# Patient Record
Sex: MALE | Race: WHITE | NOT HISPANIC OR LATINO | Employment: FULL TIME | URBAN - METROPOLITAN AREA
[De-identification: names, ages, dates, MRNs, and addresses within clinical notes are randomized per-mention and may not be internally consistent; named-entity substitution may affect disease eponyms.]

---

## 2020-11-20 PROCEDURE — U0003 INFECTIOUS AGENT DETECTION BY NUCLEIC ACID (DNA OR RNA); SEVERE ACUTE RESPIRATORY SYNDROME CORONAVIRUS 2 (SARS-COV-2) (CORONAVIRUS DISEASE [COVID-19]), AMPLIFIED PROBE TECHNIQUE, MAKING USE OF HIGH THROUGHPUT TECHNOLOGIES AS DESCRIBED BY CMS-2020-01-R: HCPCS | Performed by: NURSE PRACTITIONER

## 2022-01-05 ENCOUNTER — APPOINTMENT (OUTPATIENT)
Dept: GENERAL RADIOLOGY | Facility: HOSPITAL | Age: 62
End: 2022-01-05

## 2022-01-05 ENCOUNTER — HOSPITAL ENCOUNTER (EMERGENCY)
Facility: HOSPITAL | Age: 62
Discharge: HOME | End: 2022-01-05
Admitting: EMERGENCY MEDICINE

## 2022-01-05 VITALS
TEMPERATURE: 97.2 F | OXYGEN SATURATION: 96 % | WEIGHT: 230.16 LBS | RESPIRATION RATE: 16 BRPM | HEART RATE: 63 BPM | SYSTOLIC BLOOD PRESSURE: 127 MMHG | BODY MASS INDEX: 29.54 KG/M2 | HEIGHT: 74 IN | DIASTOLIC BLOOD PRESSURE: 82 MMHG

## 2022-01-05 DIAGNOSIS — M70.41 BURSITIS, PREPATELLAR, RIGHT: Primary | ICD-10-CM

## 2022-01-05 LAB
ALBUMIN SERPL-MCNC: 3.9 G/DL (ref 3.5–5.2)
ALBUMIN/GLOB SERPL: 1.3 G/DL
ALP SERPL-CCNC: 97 U/L (ref 39–117)
ALT SERPL W P-5'-P-CCNC: 11 U/L (ref 1–41)
ANION GAP SERPL CALCULATED.3IONS-SCNC: 12 MMOL/L (ref 5–15)
AST SERPL-CCNC: 9 U/L (ref 1–40)
BILIRUB SERPL-MCNC: 0.5 MG/DL (ref 0–1.2)
BUN SERPL-MCNC: 13 MG/DL (ref 8–23)
BUN/CREAT SERPL: 18.1 (ref 7–25)
CALCIUM SPEC-SCNC: 9.2 MG/DL (ref 8.6–10.5)
CHLORIDE SERPL-SCNC: 97 MMOL/L (ref 98–107)
CO2 SERPL-SCNC: 25 MMOL/L (ref 22–29)
CREAT SERPL-MCNC: 0.72 MG/DL (ref 0.76–1.27)
CRP SERPL-MCNC: 6.05 MG/DL (ref 0–0.5)
ERYTHROCYTE [SEDIMENTATION RATE] IN BLOOD: 26 MM/HR (ref 0–20)
GFR SERPL CREATININE-BSD FRML MDRD: 111 ML/MIN/1.73
GLOBULIN UR ELPH-MCNC: 3.1 GM/DL
GLUCOSE SERPL-MCNC: 411 MG/DL (ref 65–99)
POTASSIUM SERPL-SCNC: 3.9 MMOL/L (ref 3.5–5.2)
PROT SERPL-MCNC: 7 G/DL (ref 6–8.5)
SODIUM SERPL-SCNC: 134 MMOL/L (ref 136–145)
URATE SERPL-MCNC: 3.1 MG/DL (ref 3.4–7)

## 2022-01-05 PROCEDURE — 86140 C-REACTIVE PROTEIN: CPT | Performed by: NURSE PRACTITIONER

## 2022-01-05 PROCEDURE — 84550 ASSAY OF BLOOD/URIC ACID: CPT | Performed by: NURSE PRACTITIONER

## 2022-01-05 PROCEDURE — 87147 CULTURE TYPE IMMUNOLOGIC: CPT | Performed by: NURSE PRACTITIONER

## 2022-01-05 PROCEDURE — 99283 EMERGENCY DEPT VISIT LOW MDM: CPT

## 2022-01-05 PROCEDURE — 80053 COMPREHEN METABOLIC PANEL: CPT | Performed by: NURSE PRACTITIONER

## 2022-01-05 PROCEDURE — 87205 SMEAR GRAM STAIN: CPT | Performed by: NURSE PRACTITIONER

## 2022-01-05 PROCEDURE — 87070 CULTURE OTHR SPECIMN AEROBIC: CPT | Performed by: NURSE PRACTITIONER

## 2022-01-05 PROCEDURE — 85652 RBC SED RATE AUTOMATED: CPT | Performed by: NURSE PRACTITIONER

## 2022-01-05 PROCEDURE — 87186 SC STD MICRODIL/AGAR DIL: CPT | Performed by: NURSE PRACTITIONER

## 2022-01-05 PROCEDURE — 73562 X-RAY EXAM OF KNEE 3: CPT

## 2022-01-05 RX ORDER — SODIUM CHLORIDE 0.9 % (FLUSH) 0.9 %
10 SYRINGE (ML) INJECTION AS NEEDED
Status: DISCONTINUED | OUTPATIENT
Start: 2022-01-05 | End: 2022-01-05 | Stop reason: HOSPADM

## 2022-01-05 NOTE — DISCHARGE INSTRUCTIONS
Rest and compress with ace wrap.  If pain and swelling persist past 5 to 7 days please call your primary care physician and see for further complaints.  May take Tylenol and/or ibuprofen as directed to help with pain and swelling.

## 2022-01-05 NOTE — ED NOTES
Discussed pt's high blood sugar, home supplies, diabetic meds due to bs registering 411.     Jacquelyn Bernstein RN  01/05/22 0625

## 2022-01-05 NOTE — ED PROVIDER NOTES
Subjective   61-year-old  male presents to the emergency room with complaint of right knee swelling with minimal pain.  Patient states that he may have bumped his knee on something a few days ago.  Patient denies fever cough congestion.  Patient denies nausea vomiting or diarrhea.  Onset: Few days  Location: Top of right knee  Duration: Few days  Character: Swelling and redness   aggravating/Alleviating Factors: Unknown but thinks he might have bumped his knee against something/rest  Radiation: None  Severity: Moderate to severe            Review of Systems   Musculoskeletal: Positive for arthralgias, joint swelling and myalgias. Negative for gait problem.   All other systems reviewed and are negative.      Past Medical History:   Diagnosis Date   • Diabetes mellitus (CMS/HCC)    • Disease of thyroid gland    • Hyperlipidemia    • Myocardial infarction (CMS/HCC)        No Known Allergies    Past Surgical History:   Procedure Laterality Date   • ANKLE SURGERY Right    • CARDIAC CATHETERIZATION     • HIP SURGERY Right    • ROTATOR CUFF REPAIR Right        No family history on file.    Social History     Socioeconomic History   • Marital status: Single   Tobacco Use   • Smoking status: Never Smoker   • Smokeless tobacco: Never Used   Vaping Use   • Vaping Use: Never used   Substance and Sexual Activity   • Alcohol use: Not Currently           Objective   Physical Exam  Constitutional:       General: He is not in acute distress.     Appearance: Normal appearance. He is not ill-appearing, toxic-appearing or diaphoretic.   Musculoskeletal:         General: Normal range of motion.        Legs:       Comments: Positive localized erythema and moderate edema noted just subcutaneous and top of patella bone.   Skin:     General: Skin is warm.      Capillary Refill: Capillary refill takes less than 2 seconds.      Findings: Erythema present. No abscess or signs of injury.          Neurological:      Mental Status: He is  alert.         Procedures           ED Course      XR Knee 3 View Right    Result Date: 1/5/2022  Abnormal prepatellar and infrapatellar superficial soft tissue swelling suggestive of prepatellar bursitis. No underlying osseous abnormality.  Electronically Signed By-Gricel Cunningham MD On:1/5/2022 12:43 PM This report was finalized on [id number] by  Gricel Cunningham MD.      Labs Reviewed   COMPREHENSIVE METABOLIC PANEL - Abnormal; Notable for the following components:       Result Value    Glucose 411 (*)     Creatinine 0.72 (*)     Sodium 134 (*)     Chloride 97 (*)     All other components within normal limits    Narrative:     GFR Normal >60  Chronic Kidney Disease <60  Kidney Failure <15     SEDIMENTATION RATE - Abnormal; Notable for the following components:    Sed Rate 26 (*)     All other components within normal limits   C-REACTIVE PROTEIN - Abnormal; Notable for the following components:    C-Reactive Protein 6.05 (*)     All other components within normal limits   URIC ACID - Abnormal; Notable for the following components:    Uric Acid 3.1 (*)     All other components within normal limits   WOUND CULTURE             Compression applied prior to discharge.  Encourage patient to follow-up with his primary care physician within the next 3 to 5 days for further evaluation care of his right knee pain and swelling.  Courage patient to wear compression and apply  ice pack to right knee to help with swelling.                     Vitals:    01/05/22 1424   BP: 127/82   Pulse: 63   Resp: 16   Temp: 97.2 °F (36.2 °C)   SpO2: 96%                     MDM    Final diagnoses:   Bursitis, prepatellar, right       ED Disposition  ED Disposition     ED Disposition Condition Comment    Discharge Stable           Damian Win MD  [facility] [address]  [phone]    Schedule an appointment as soon as possible for a visit in 1 week  As needed, If symptoms worsen         Medication List      No changes  were made to your prescriptions during this visit.          Jacqueline Orr, APRN  01/05/22 3347

## 2022-01-06 NOTE — PHARMACY RECOMMENDATION
Preliminary wound culture resulted with Staphylococcus aureus (MSSA).  Patient was seen for knee pain and swelling. At that time his CRP was 6, ESR 26, WBC wnl, afebrile, on XR: abnormal prepatellar and infrapatellar superficial soft tissue swelling. Patient was advised to f/u with PCP, wear compression and apply ice, but was not prescribed any antibiotics. Discussed case with Shanice Gonzalez and Dr King who advised to bring patient in for re-evaluation and potential need for additional imaging. Attempted to call the patient, no answer and VM box is full. Attempted to call patient's contact (mother), no answer but I was able to leave a VM asking to call back.   Will attempt to call tomorrow. Will continue to follow until C&S final.     Microbiology Results (last 10 days)       Procedure Component Value - Date/Time    Wound Culture - Wound, Knee, Right [[id number]]  (Abnormal) Collected: 01/05/22 1328    Lab Status: Preliminary result Specimen: Wound from Knee, Right Updated: 01/06/22 1042     Wound Culture Heavy growth (4+) Staphylococcus aureus     Gram Stain Few (2+) WBCs per low power field      Few (2+) Gram positive cocci in clusters            Chey Lazcano, PharmD  1/6/2022 13:00 EST

## 2022-01-07 ENCOUNTER — HOSPITAL ENCOUNTER (OUTPATIENT)
Facility: HOSPITAL | Age: 62
Setting detail: OBSERVATION
LOS: 1 days | Discharge: HOME HEALTH | End: 2022-01-12
Attending: EMERGENCY MEDICINE | Admitting: HOSPITALIST

## 2022-01-07 DIAGNOSIS — E11.65 UNCONTROLLED TYPE 2 DIABETES MELLITUS WITH HYPERGLYCEMIA: ICD-10-CM

## 2022-01-07 DIAGNOSIS — L03.115 CELLULITIS OF KNEE, RIGHT: ICD-10-CM

## 2022-01-07 DIAGNOSIS — M70.41 PREPATELLAR BURSITIS OF RIGHT KNEE: Primary | ICD-10-CM

## 2022-01-07 LAB
ALBUMIN SERPL-MCNC: 3.9 G/DL (ref 3.5–5.2)
ALBUMIN/GLOB SERPL: 1.2 G/DL
ALP SERPL-CCNC: 81 U/L (ref 39–117)
ALT SERPL W P-5'-P-CCNC: 8 U/L (ref 1–41)
ANION GAP SERPL CALCULATED.3IONS-SCNC: 12 MMOL/L (ref 5–15)
APPEARANCE FLD: ABNORMAL
AST SERPL-CCNC: 6 U/L (ref 1–40)
BACTERIA SPEC AEROBE CULT: ABNORMAL
BASOPHILS # BLD AUTO: 0 10*3/MM3 (ref 0–0.2)
BASOPHILS NFR BLD AUTO: 0.5 % (ref 0–1.5)
BILIRUB SERPL-MCNC: 0.7 MG/DL (ref 0–1.2)
BUN SERPL-MCNC: 13 MG/DL (ref 8–23)
BUN/CREAT SERPL: 19.7 (ref 7–25)
CALCIUM SPEC-SCNC: 9.5 MG/DL (ref 8.6–10.5)
CHLORIDE SERPL-SCNC: 98 MMOL/L (ref 98–107)
CO2 SERPL-SCNC: 25 MMOL/L (ref 22–29)
COLOR FLD: ABNORMAL
CREAT SERPL-MCNC: 0.66 MG/DL (ref 0.76–1.27)
CRP SERPL-MCNC: 12.51 MG/DL (ref 0–0.5)
CRYSTALS FLD MICRO: NORMAL
DEPRECATED RDW RBC AUTO: 39.8 FL (ref 37–54)
EOSINOPHIL # BLD AUTO: 0.1 10*3/MM3 (ref 0–0.4)
EOSINOPHIL NFR BLD AUTO: 0.5 % (ref 0.3–6.2)
ERYTHROCYTE [DISTWIDTH] IN BLOOD BY AUTOMATED COUNT: 13.1 % (ref 12.3–15.4)
ERYTHROCYTE [SEDIMENTATION RATE] IN BLOOD: 41 MM/HR (ref 0–20)
ETHANOL UR QL: <0.01 %
GFR SERPL CREATININE-BSD FRML MDRD: 122 ML/MIN/1.73
GLOBULIN UR ELPH-MCNC: 3.2 GM/DL
GLUCOSE SERPL-MCNC: 383 MG/DL (ref 65–99)
GRAM STN SPEC: ABNORMAL
GRAM STN SPEC: ABNORMAL
HCT VFR BLD AUTO: 41 % (ref 37.5–51)
HGB BLD-MCNC: 14.6 G/DL (ref 13–17.7)
LYMPHOCYTES # BLD AUTO: 1.5 10*3/MM3 (ref 0.7–3.1)
LYMPHOCYTES NFR BLD AUTO: 15.9 % (ref 19.6–45.3)
LYMPHOCYTES NFR FLD MANUAL: 2 %
MCH RBC QN AUTO: 29.7 PG (ref 26.6–33)
MCHC RBC AUTO-ENTMCNC: 35.6 G/DL (ref 31.5–35.7)
MCV RBC AUTO: 83.5 FL (ref 79–97)
METHOD: ABNORMAL
MONOCYTES # BLD AUTO: 0.5 10*3/MM3 (ref 0.1–0.9)
MONOCYTES NFR BLD AUTO: 5.6 % (ref 5–12)
NEUTROPHILS NFR BLD AUTO: 7.4 10*3/MM3 (ref 1.7–7)
NEUTROPHILS NFR BLD AUTO: 77.5 % (ref 42.7–76)
NEUTROPHILS NFR FLD MANUAL: 98 %
NRBC BLD AUTO-RTO: 0 /100 WBC (ref 0–0.2)
NUC CELL # FLD: 7849 /MM3
PLATELET # BLD AUTO: 243 10*3/MM3 (ref 140–450)
PMV BLD AUTO: 8 FL (ref 6–12)
POTASSIUM SERPL-SCNC: 4.3 MMOL/L (ref 3.5–5.2)
PROT SERPL-MCNC: 7.1 G/DL (ref 6–8.5)
RBC # BLD AUTO: 4.92 10*6/MM3 (ref 4.14–5.8)
SARS-COV-2 RNA PNL SPEC NAA+PROBE: DETECTED
SODIUM SERPL-SCNC: 135 MMOL/L (ref 136–145)
WBC NRBC COR # BLD: 9.6 10*3/MM3 (ref 3.4–10.8)

## 2022-01-07 PROCEDURE — 89060 EXAM SYNOVIAL FLUID CRYSTALS: CPT | Performed by: EMERGENCY MEDICINE

## 2022-01-07 PROCEDURE — G0378 HOSPITAL OBSERVATION PER HR: HCPCS

## 2022-01-07 PROCEDURE — 99284 EMERGENCY DEPT VISIT MOD MDM: CPT

## 2022-01-07 PROCEDURE — 83036 HEMOGLOBIN GLYCOSYLATED A1C: CPT | Performed by: NURSE PRACTITIONER

## 2022-01-07 PROCEDURE — 96367 TX/PROPH/DG ADDL SEQ IV INF: CPT

## 2022-01-07 PROCEDURE — C9803 HOPD COVID-19 SPEC COLLECT: HCPCS

## 2022-01-07 PROCEDURE — 25010000002 KETOROLAC TROMETHAMINE PER 15 MG: Performed by: EMERGENCY MEDICINE

## 2022-01-07 PROCEDURE — 87205 SMEAR GRAM STAIN: CPT | Performed by: EMERGENCY MEDICINE

## 2022-01-07 PROCEDURE — 84157 ASSAY OF PROTEIN OTHER: CPT | Performed by: EMERGENCY MEDICINE

## 2022-01-07 PROCEDURE — 87635 SARS-COV-2 COVID-19 AMP PRB: CPT | Performed by: NURSE PRACTITIONER

## 2022-01-07 PROCEDURE — 96366 THER/PROPH/DIAG IV INF ADDON: CPT

## 2022-01-07 PROCEDURE — 87070 CULTURE OTHR SPECIMN AEROBIC: CPT | Performed by: EMERGENCY MEDICINE

## 2022-01-07 PROCEDURE — 63710000001 INSULIN LISPRO (HUMAN) PER 5 UNITS: Performed by: EMERGENCY MEDICINE

## 2022-01-07 PROCEDURE — 86140 C-REACTIVE PROTEIN: CPT | Performed by: EMERGENCY MEDICINE

## 2022-01-07 PROCEDURE — 87186 SC STD MICRODIL/AGAR DIL: CPT | Performed by: EMERGENCY MEDICINE

## 2022-01-07 PROCEDURE — 25010000002 VANCOMYCIN 10 G RECONSTITUTED SOLUTION: Performed by: EMERGENCY MEDICINE

## 2022-01-07 PROCEDURE — 87040 BLOOD CULTURE FOR BACTERIA: CPT | Performed by: EMERGENCY MEDICINE

## 2022-01-07 PROCEDURE — 85025 COMPLETE CBC W/AUTO DIFF WBC: CPT | Performed by: EMERGENCY MEDICINE

## 2022-01-07 PROCEDURE — 96361 HYDRATE IV INFUSION ADD-ON: CPT

## 2022-01-07 PROCEDURE — 96365 THER/PROPH/DIAG IV INF INIT: CPT

## 2022-01-07 PROCEDURE — 85652 RBC SED RATE AUTOMATED: CPT | Performed by: EMERGENCY MEDICINE

## 2022-01-07 PROCEDURE — 80053 COMPREHEN METABOLIC PANEL: CPT | Performed by: EMERGENCY MEDICINE

## 2022-01-07 PROCEDURE — 99219 PR INITIAL OBSERVATION CARE/DAY 50 MINUTES: CPT | Performed by: NURSE PRACTITIONER

## 2022-01-07 PROCEDURE — 89051 BODY FLUID CELL COUNT: CPT | Performed by: EMERGENCY MEDICINE

## 2022-01-07 PROCEDURE — 96375 TX/PRO/DX INJ NEW DRUG ADDON: CPT

## 2022-01-07 PROCEDURE — 25010000002 ONDANSETRON PER 1 MG: Performed by: EMERGENCY MEDICINE

## 2022-01-07 PROCEDURE — 87147 CULTURE TYPE IMMUNOLOGIC: CPT | Performed by: EMERGENCY MEDICINE

## 2022-01-07 PROCEDURE — 0 MORPHINE SULFATE 4 MG/ML SOLUTION: Performed by: EMERGENCY MEDICINE

## 2022-01-07 PROCEDURE — 82945 GLUCOSE OTHER FLUID: CPT | Performed by: EMERGENCY MEDICINE

## 2022-01-07 PROCEDURE — 82077 ASSAY SPEC XCP UR&BREATH IA: CPT | Performed by: NURSE PRACTITIONER

## 2022-01-07 RX ORDER — KETOROLAC TROMETHAMINE 30 MG/ML
30 INJECTION, SOLUTION INTRAMUSCULAR; INTRAVENOUS ONCE
Status: COMPLETED | OUTPATIENT
Start: 2022-01-07 | End: 2022-01-07

## 2022-01-07 RX ORDER — POLYETHYLENE GLYCOL 3350 17 G/17G
17 POWDER, FOR SOLUTION ORAL DAILY PRN
Status: DISCONTINUED | OUTPATIENT
Start: 2022-01-07 | End: 2022-01-12 | Stop reason: HOSPADM

## 2022-01-07 RX ORDER — SODIUM CHLORIDE 0.9 % (FLUSH) 0.9 %
3-10 SYRINGE (ML) INJECTION AS NEEDED
Status: DISCONTINUED | OUTPATIENT
Start: 2022-01-07 | End: 2022-01-12 | Stop reason: HOSPADM

## 2022-01-07 RX ORDER — ACETAMINOPHEN 650 MG/1
650 SUPPOSITORY RECTAL EVERY 4 HOURS PRN
Status: DISCONTINUED | OUTPATIENT
Start: 2022-01-07 | End: 2022-01-12 | Stop reason: HOSPADM

## 2022-01-07 RX ORDER — CHOLECALCIFEROL (VITAMIN D3) 125 MCG
5 CAPSULE ORAL NIGHTLY PRN
Status: DISCONTINUED | OUTPATIENT
Start: 2022-01-07 | End: 2022-01-12 | Stop reason: HOSPADM

## 2022-01-07 RX ORDER — CLINDAMYCIN PHOSPHATE 900 MG/50ML
900 INJECTION, SOLUTION INTRAVENOUS ONCE
Status: COMPLETED | OUTPATIENT
Start: 2022-01-07 | End: 2022-01-07

## 2022-01-07 RX ORDER — CLINDAMYCIN PHOSPHATE 900 MG/50ML
900 INJECTION, SOLUTION INTRAVENOUS EVERY 8 HOURS
Status: DISCONTINUED | OUTPATIENT
Start: 2022-01-08 | End: 2022-01-10

## 2022-01-07 RX ORDER — NITROGLYCERIN 0.4 MG/1
0.4 TABLET SUBLINGUAL
Status: DISCONTINUED | OUTPATIENT
Start: 2022-01-07 | End: 2022-01-12 | Stop reason: HOSPADM

## 2022-01-07 RX ORDER — INSULIN LISPRO 100 [IU]/ML
0-9 INJECTION, SOLUTION INTRAVENOUS; SUBCUTANEOUS
Status: DISCONTINUED | OUTPATIENT
Start: 2022-01-08 | End: 2022-01-12 | Stop reason: HOSPADM

## 2022-01-07 RX ORDER — AMOXICILLIN 250 MG
2 CAPSULE ORAL 2 TIMES DAILY
Status: DISCONTINUED | OUTPATIENT
Start: 2022-01-07 | End: 2022-01-12 | Stop reason: HOSPADM

## 2022-01-07 RX ORDER — ACETAMINOPHEN 325 MG/1
650 TABLET ORAL EVERY 4 HOURS PRN
Status: DISCONTINUED | OUTPATIENT
Start: 2022-01-07 | End: 2022-01-12 | Stop reason: HOSPADM

## 2022-01-07 RX ORDER — SODIUM CHLORIDE 0.9 % (FLUSH) 0.9 %
3 SYRINGE (ML) INJECTION EVERY 12 HOURS SCHEDULED
Status: DISCONTINUED | OUTPATIENT
Start: 2022-01-07 | End: 2022-01-12 | Stop reason: HOSPADM

## 2022-01-07 RX ORDER — ONDANSETRON 4 MG/1
4 TABLET, FILM COATED ORAL EVERY 6 HOURS PRN
Status: DISCONTINUED | OUTPATIENT
Start: 2022-01-07 | End: 2022-01-12 | Stop reason: HOSPADM

## 2022-01-07 RX ORDER — DEXTROSE MONOHYDRATE 25 G/50ML
25 INJECTION, SOLUTION INTRAVENOUS
Status: DISCONTINUED | OUTPATIENT
Start: 2022-01-07 | End: 2022-01-12 | Stop reason: HOSPADM

## 2022-01-07 RX ORDER — SODIUM CHLORIDE 9 MG/ML
100 INJECTION, SOLUTION INTRAVENOUS CONTINUOUS
Status: DISCONTINUED | OUTPATIENT
Start: 2022-01-07 | End: 2022-01-12 | Stop reason: HOSPADM

## 2022-01-07 RX ORDER — BISACODYL 5 MG/1
5 TABLET, DELAYED RELEASE ORAL DAILY PRN
Status: DISCONTINUED | OUTPATIENT
Start: 2022-01-07 | End: 2022-01-12 | Stop reason: HOSPADM

## 2022-01-07 RX ORDER — INSULIN LISPRO 100 [IU]/ML
0-9 INJECTION, SOLUTION INTRAVENOUS; SUBCUTANEOUS AS NEEDED
Status: DISCONTINUED | OUTPATIENT
Start: 2022-01-07 | End: 2022-01-08

## 2022-01-07 RX ORDER — OLANZAPINE 10 MG/2ML
1 INJECTION, POWDER, LYOPHILIZED, FOR SOLUTION INTRAMUSCULAR AS NEEDED
Status: DISCONTINUED | OUTPATIENT
Start: 2022-01-07 | End: 2022-01-12 | Stop reason: HOSPADM

## 2022-01-07 RX ORDER — NICOTINE POLACRILEX 4 MG
15 LOZENGE BUCCAL
Status: DISCONTINUED | OUTPATIENT
Start: 2022-01-07 | End: 2022-01-12 | Stop reason: HOSPADM

## 2022-01-07 RX ORDER — ONDANSETRON 2 MG/ML
4 INJECTION INTRAMUSCULAR; INTRAVENOUS EVERY 6 HOURS PRN
Status: DISCONTINUED | OUTPATIENT
Start: 2022-01-07 | End: 2022-01-12 | Stop reason: HOSPADM

## 2022-01-07 RX ORDER — INSULIN LISPRO 100 [IU]/ML
8 INJECTION, SOLUTION INTRAVENOUS; SUBCUTANEOUS ONCE
Status: COMPLETED | OUTPATIENT
Start: 2022-01-07 | End: 2022-01-07

## 2022-01-07 RX ORDER — ONDANSETRON 2 MG/ML
4 INJECTION INTRAMUSCULAR; INTRAVENOUS ONCE
Status: COMPLETED | OUTPATIENT
Start: 2022-01-07 | End: 2022-01-07

## 2022-01-07 RX ORDER — BISACODYL 10 MG
10 SUPPOSITORY, RECTAL RECTAL DAILY PRN
Status: DISCONTINUED | OUTPATIENT
Start: 2022-01-07 | End: 2022-01-12 | Stop reason: HOSPADM

## 2022-01-07 RX ORDER — ACETAMINOPHEN 160 MG/5ML
650 SOLUTION ORAL EVERY 4 HOURS PRN
Status: DISCONTINUED | OUTPATIENT
Start: 2022-01-07 | End: 2022-01-12 | Stop reason: HOSPADM

## 2022-01-07 RX ORDER — MORPHINE SULFATE 4 MG/ML
4 INJECTION, SOLUTION INTRAMUSCULAR; INTRAVENOUS ONCE
Status: COMPLETED | OUTPATIENT
Start: 2022-01-07 | End: 2022-01-07

## 2022-01-07 RX ADMIN — MORPHINE SULFATE 4 MG: 4 INJECTION INTRAVENOUS at 14:45

## 2022-01-07 RX ADMIN — ONDANSETRON 4 MG: 2 INJECTION INTRAMUSCULAR; INTRAVENOUS at 14:44

## 2022-01-07 RX ADMIN — VANCOMYCIN HYDROCHLORIDE 2000 MG: 100 INJECTION, POWDER, LYOPHILIZED, FOR SOLUTION INTRAVENOUS at 16:34

## 2022-01-07 RX ADMIN — SODIUM CHLORIDE 100 ML/HR: 9 INJECTION, SOLUTION INTRAVENOUS at 19:54

## 2022-01-07 RX ADMIN — CLINDAMYCIN PHOSPHATE 900 MG: 900 INJECTION, SOLUTION INTRAVENOUS at 15:45

## 2022-01-07 RX ADMIN — INSULIN LISPRO 8 UNITS: 100 INJECTION, SOLUTION INTRAVENOUS; SUBCUTANEOUS at 19:37

## 2022-01-07 RX ADMIN — KETOROLAC TROMETHAMINE 30 MG: 30 INJECTION, SOLUTION INTRAMUSCULAR; INTRAVENOUS at 14:45

## 2022-01-07 NOTE — ED NOTES
Patient resting on stretcher. Urinal given to patient per request. Monitor in place. Call light within reach.     Jonathan Rao, RN  01/07/22 3826

## 2022-01-07 NOTE — PHARMACY RECOMMENDATION
"Spoke with the patient who said his knee pain is worse, he \"cant even put his clothes on.\" Patient will report back for re-evaluation today. Please see previous note from 1/6/22 about knee culture assessment and ED provider recommendations.     Chey Lazcano, PharmD, BCPS  01/07/22 12:00 EST  "

## 2022-01-08 LAB
ANION GAP SERPL CALCULATED.3IONS-SCNC: 10 MMOL/L (ref 5–15)
BASOPHILS # BLD AUTO: 0.1 10*3/MM3 (ref 0–0.2)
BASOPHILS NFR BLD AUTO: 1.3 % (ref 0–1.5)
BUN SERPL-MCNC: 14 MG/DL (ref 8–23)
BUN/CREAT SERPL: 21.9 (ref 7–25)
CALCIUM SPEC-SCNC: 8.7 MG/DL (ref 8.6–10.5)
CHLORIDE SERPL-SCNC: 103 MMOL/L (ref 98–107)
CO2 SERPL-SCNC: 24 MMOL/L (ref 22–29)
CREAT SERPL-MCNC: 0.64 MG/DL (ref 0.76–1.27)
D-LACTATE SERPL-SCNC: 0.8 MMOL/L (ref 0.5–2)
DEPRECATED RDW RBC AUTO: 38.5 FL (ref 37–54)
EOSINOPHIL # BLD AUTO: 0.1 10*3/MM3 (ref 0–0.4)
EOSINOPHIL NFR BLD AUTO: 1.1 % (ref 0.3–6.2)
ERYTHROCYTE [DISTWIDTH] IN BLOOD BY AUTOMATED COUNT: 12.7 % (ref 12.3–15.4)
GFR SERPL CREATININE-BSD FRML MDRD: 127 ML/MIN/1.73
GLUCOSE BLDC GLUCOMTR-MCNC: 225 MG/DL (ref 70–105)
GLUCOSE BLDC GLUCOMTR-MCNC: 249 MG/DL (ref 70–105)
GLUCOSE BLDC GLUCOMTR-MCNC: 259 MG/DL (ref 70–105)
GLUCOSE BLDC GLUCOMTR-MCNC: 292 MG/DL (ref 70–105)
GLUCOSE FLD-MCNC: 164 MG/DL
GLUCOSE SERPL-MCNC: 280 MG/DL (ref 65–99)
HCT VFR BLD AUTO: 37.5 % (ref 37.5–51)
HGB BLD-MCNC: 13.4 G/DL (ref 13–17.7)
LYMPHOCYTES # BLD AUTO: 2 10*3/MM3 (ref 0.7–3.1)
LYMPHOCYTES NFR BLD AUTO: 26.1 % (ref 19.6–45.3)
MCH RBC QN AUTO: 29.8 PG (ref 26.6–33)
MCHC RBC AUTO-ENTMCNC: 35.7 G/DL (ref 31.5–35.7)
MCV RBC AUTO: 83.4 FL (ref 79–97)
MONOCYTES # BLD AUTO: 0.4 10*3/MM3 (ref 0.1–0.9)
MONOCYTES NFR BLD AUTO: 5.7 % (ref 5–12)
MRSA DNA SPEC QL NAA+PROBE: NORMAL
NEUTROPHILS NFR BLD AUTO: 5 10*3/MM3 (ref 1.7–7)
NEUTROPHILS NFR BLD AUTO: 65.8 % (ref 42.7–76)
NRBC BLD AUTO-RTO: 0 /100 WBC (ref 0–0.2)
PLATELET # BLD AUTO: 248 10*3/MM3 (ref 140–450)
PMV BLD AUTO: 7.9 FL (ref 6–12)
POTASSIUM SERPL-SCNC: 4.3 MMOL/L (ref 3.5–5.2)
PROT FLD-MCNC: 3.2 G/DL
RBC # BLD AUTO: 4.49 10*6/MM3 (ref 4.14–5.8)
SODIUM SERPL-SCNC: 137 MMOL/L (ref 136–145)
VANCOMYCIN PEAK SERPL-MCNC: 15 MCG/ML (ref 20–40)
WBC NRBC COR # BLD: 7.6 10*3/MM3 (ref 3.4–10.8)

## 2022-01-08 PROCEDURE — 96366 THER/PROPH/DIAG IV INF ADDON: CPT

## 2022-01-08 PROCEDURE — 96372 THER/PROPH/DIAG INJ SC/IM: CPT

## 2022-01-08 PROCEDURE — 63710000001 INSULIN LISPRO (HUMAN) PER 5 UNITS: Performed by: INTERNAL MEDICINE

## 2022-01-08 PROCEDURE — 99222 1ST HOSP IP/OBS MODERATE 55: CPT | Performed by: INTERNAL MEDICINE

## 2022-01-08 PROCEDURE — 25010000002 VANCOMYCIN 10 G RECONSTITUTED SOLUTION: Performed by: EMERGENCY MEDICINE

## 2022-01-08 PROCEDURE — 80048 BASIC METABOLIC PNL TOTAL CA: CPT | Performed by: NURSE PRACTITIONER

## 2022-01-08 PROCEDURE — 25010000002 ENOXAPARIN PER 10 MG: Performed by: NURSE PRACTITIONER

## 2022-01-08 PROCEDURE — 82962 GLUCOSE BLOOD TEST: CPT

## 2022-01-08 PROCEDURE — 63710000001 INSULIN GLARGINE PER 5 UNITS: Performed by: INTERNAL MEDICINE

## 2022-01-08 PROCEDURE — 83605 ASSAY OF LACTIC ACID: CPT | Performed by: NURSE PRACTITIONER

## 2022-01-08 PROCEDURE — 87641 MR-STAPH DNA AMP PROBE: CPT | Performed by: EMERGENCY MEDICINE

## 2022-01-08 PROCEDURE — 36415 COLL VENOUS BLD VENIPUNCTURE: CPT | Performed by: NURSE PRACTITIONER

## 2022-01-08 PROCEDURE — 80202 ASSAY OF VANCOMYCIN: CPT | Performed by: EMERGENCY MEDICINE

## 2022-01-08 PROCEDURE — 85025 COMPLETE CBC W/AUTO DIFF WBC: CPT | Performed by: NURSE PRACTITIONER

## 2022-01-08 PROCEDURE — G0378 HOSPITAL OBSERVATION PER HR: HCPCS

## 2022-01-08 PROCEDURE — 36415 COLL VENOUS BLD VENIPUNCTURE: CPT | Performed by: EMERGENCY MEDICINE

## 2022-01-08 PROCEDURE — 63710000001 INSULIN LISPRO (HUMAN) PER 5 UNITS: Performed by: NURSE PRACTITIONER

## 2022-01-08 PROCEDURE — 99225 PR SBSQ OBSERVATION CARE/DAY 25 MINUTES: CPT | Performed by: HOSPITALIST

## 2022-01-08 RX ORDER — INSULIN LISPRO 100 [IU]/ML
10 INJECTION, SOLUTION INTRAVENOUS; SUBCUTANEOUS
Status: DISCONTINUED | OUTPATIENT
Start: 2022-01-08 | End: 2022-01-09

## 2022-01-08 RX ORDER — OXYCODONE HYDROCHLORIDE 5 MG/1
10 TABLET ORAL EVERY 4 HOURS PRN
Status: DISCONTINUED | OUTPATIENT
Start: 2022-01-08 | End: 2022-01-12 | Stop reason: HOSPADM

## 2022-01-08 RX ADMIN — CLINDAMYCIN PHOSPHATE 900 MG: 900 INJECTION, SOLUTION INTRAVENOUS at 19:28

## 2022-01-08 RX ADMIN — SODIUM CHLORIDE 100 ML/HR: 9 INJECTION, SOLUTION INTRAVENOUS at 10:02

## 2022-01-08 RX ADMIN — SODIUM CHLORIDE 100 ML/HR: 9 INJECTION, SOLUTION INTRAVENOUS at 21:02

## 2022-01-08 RX ADMIN — OXYCODONE 10 MG: 5 TABLET ORAL at 21:02

## 2022-01-08 RX ADMIN — INSULIN GLARGINE 30 UNITS: 100 INJECTION, SOLUTION SUBCUTANEOUS at 12:50

## 2022-01-08 RX ADMIN — INSULIN LISPRO 10 UNITS: 100 INJECTION, SOLUTION INTRAVENOUS; SUBCUTANEOUS at 13:08

## 2022-01-08 RX ADMIN — VANCOMYCIN HYDROCHLORIDE 1250 MG: 10 INJECTION, POWDER, LYOPHILIZED, FOR SOLUTION INTRAVENOUS at 04:02

## 2022-01-08 RX ADMIN — SODIUM CHLORIDE, PRESERVATIVE FREE 3 ML: 5 INJECTION INTRAVENOUS at 21:03

## 2022-01-08 RX ADMIN — INSULIN LISPRO 6 UNITS: 100 INJECTION, SOLUTION INTRAVENOUS; SUBCUTANEOUS at 09:58

## 2022-01-08 RX ADMIN — VANCOMYCIN HYDROCHLORIDE 1250 MG: 10 INJECTION, POWDER, LYOPHILIZED, FOR SOLUTION INTRAVENOUS at 19:27

## 2022-01-08 RX ADMIN — INSULIN LISPRO 10 UNITS: 100 INJECTION, SOLUTION INTRAVENOUS; SUBCUTANEOUS at 19:35

## 2022-01-08 RX ADMIN — SODIUM CHLORIDE, PRESERVATIVE FREE 3 ML: 5 INJECTION INTRAVENOUS at 09:59

## 2022-01-08 RX ADMIN — CLINDAMYCIN PHOSPHATE 900 MG: 900 INJECTION, SOLUTION INTRAVENOUS at 09:59

## 2022-01-08 RX ADMIN — INSULIN LISPRO 6 UNITS: 100 INJECTION, SOLUTION INTRAVENOUS; SUBCUTANEOUS at 19:34

## 2022-01-08 RX ADMIN — INSULIN LISPRO 9 UNITS: 100 INJECTION, SOLUTION INTRAVENOUS; SUBCUTANEOUS at 13:08

## 2022-01-08 RX ADMIN — DOCUSATE SODIUM AND SENNOSIDES 2 TABLET: 8.6; 5 TABLET, FILM COATED ORAL at 09:58

## 2022-01-08 RX ADMIN — OXYCODONE 10 MG: 5 TABLET ORAL at 05:31

## 2022-01-08 RX ADMIN — CLINDAMYCIN PHOSPHATE 900 MG: 900 INJECTION, SOLUTION INTRAVENOUS at 02:10

## 2022-01-08 RX ADMIN — ENOXAPARIN SODIUM 40 MG: 40 INJECTION SUBCUTANEOUS at 19:23

## 2022-01-08 RX ADMIN — DOCUSATE SODIUM AND SENNOSIDES 2 TABLET: 8.6; 5 TABLET, FILM COATED ORAL at 21:02

## 2022-01-08 NOTE — PLAN OF CARE
Goal Outcome Evaluation:  Plan of Care Reviewed With: patient        Progress: improving  Outcome Summary: Antibiotics given overnight.  Endocrinology to see in am.

## 2022-01-08 NOTE — ED PROVIDER NOTES
Subjective   61-year-old male evaluated on the fourth for purulent drainage and prepatellar redness.  The patient was found at that time to have a CRP of 6 and a sed rate of 26 with a normal white blood cell count.  The patient states he had had no fever or chills until today.  The patient had a positive MSSA growing in his wound on 1/5 attempts were made to contact the patient and that he did not return the call although he states his mother told him he should come to the hospital he decided to come today because he felt hot.  He reports that he has had increased redness and swelling in the prepatellar area.  He reports that he has pain in that area when he flexes his knee.  He reports no decrease in sensation or circulation.  He states that he has been drinking a lot and sometimes that is a sign that his blood sugar is high but he has not actually checked his blood sugar.  He is unsure of his tetanus status          Review of Systems   Constitutional: Positive for chills, fatigue and fever. Negative for activity change.   Endocrine: Positive for polydipsia and polyuria.   Musculoskeletal: Positive for arthralgias, joint swelling and myalgias. Negative for back pain and gait problem.   Neurological: Negative for numbness.   Hematological: Does not bruise/bleed easily.   All other systems reviewed and are negative.      Past Medical History:   Diagnosis Date   • Diabetes mellitus (CMS/HCC)    • Disease of thyroid gland    • Hyperlipidemia    • Myocardial infarction (CMS/HCC)        No Known Allergies    Past Surgical History:   Procedure Laterality Date   • ANKLE SURGERY Right    • CARDIAC CATHETERIZATION     • HIP SURGERY Right    • ROTATOR CUFF REPAIR Right        No family history on file.    Social History     Socioeconomic History   • Marital status: Single   Tobacco Use   • Smoking status: Never Smoker   • Smokeless tobacco: Never Used   Vaping Use   • Vaping Use: Never used   Substance and Sexual Activity   •  Alcohol use: Not Currently           Objective   Physical Exam  Alert Kent Coma Scale 15 atypical affect   HEENT: Pupils equal and reactive to light. Conjunctivae are not injected. normal tympanic membranes. Oropharynx and nares are normal.   Neck: Supple. Midline trachea. No JVD. No goiter.   Chest: Clear and equal breath sounds bilaterally regular rate and rhythm without murmur or rub.   Abdomen: Positive bowel sounds nontender nondistended. No rebound or peritoneal signs. No CVA tenderness.   Extremities no clubbing cyanosis or edema motor sensory exam is normal the full range of motion is intact there is extensive almost hemispheric type swelling noted in the area of the prepatellar bursa.  A small skin abrasion with a centimeter is noted centrally.  There is some cellulitic change noted extending medially.  There is no definitive joint swelling identified.  There is negative drawer and Ligia's test there is no medial or lateral collateral ligament laxity Lachman's test is negative   skin: Warm and dry, no rashes or petechia.   Lymphatic: No regional lymphadenopathy. No calf pain, swelling or Nick's sign    Procedures       The patient was prepped with chlorhexidine.  Skin was infiltrated locally with Xylocaine 2% via a 25-gauge needle.  Then the patient had an 18-gauge Angiocath inserted into the prepatellar bursa avoiding the knee joint.  30 cc of purulent drainage was expressed  This was sent for culture and the Angiocath was removed intact the dressing was applied  ED Course                                 Labs Reviewed   COMPREHENSIVE METABOLIC PANEL - Abnormal; Notable for the following components:       Result Value    Glucose 383 (*)     Creatinine 0.66 (*)     Sodium 135 (*)     All other components within normal limits    Narrative:     GFR Normal >60  Chronic Kidney Disease <60  Kidney Failure <15     SEDIMENTATION RATE - Abnormal; Notable for the following components:    Sed Rate 41 (*)      All other components within normal limits   C-REACTIVE PROTEIN - Abnormal; Notable for the following components:    C-Reactive Protein 12.51 (*)     All other components within normal limits   CBC WITH AUTO DIFFERENTIAL - Abnormal; Notable for the following components:    Neutrophil % 77.5 (*)     Lymphocyte % 15.9 (*)     Neutrophils, Absolute 7.40 (*)     All other components within normal limits   BODY FLUID CELL COUNT - Abnormal; Notable for the following components:    Appearance, Fluid Hazy (*)     All other components within normal limits   BLOOD CULTURE   BLOOD CULTURE   BODY FLUID CULTURE   BODY FLUID CELL COUNT WITH DIFFERENTIAL    Narrative:     The following orders were created for panel order Body Fluid Cell Count With Differential - Body Fluid, Knee, Right.  Procedure                               Abnormality         Status                     ---------                               -----------         ------                     Body fluid cell count - ...[[id number]]  Abnormal            Final result               Body fluid differential ...[[id number]]                      Final result                 Please view results for these tests on the individual orders.   CRYSTAL EXAM   BODY FLUID DIFFERENTIAL    Narrative:     Concentrated Smear by Cytocentrifuge Prep.   GLUCOSE, BODY FLUID   PROTEIN, BODY FLUID   CBC AND DIFFERENTIAL    Narrative:     The following orders were created for panel order CBC & Differential.  Procedure                               Abnormality         Status                     ---------                               -----------         ------                     CBC Auto Differential[[id number]]        Abnormal            Final result                 Please view results for these tests on the individual orders.     Medications   Pharmacy to dose vancomycin (has no administration in time range)   insulin lispro (ADMELOG) injection 8 Units (has no administration in time range)    ondansetron (ZOFRAN) injection 4 mg (4 mg Intravenous Given 1/7/22 1444)   Morphine sulfate (PF) injection 4 mg (4 mg Intravenous Given 1/7/22 1445)   ketorolac (TORADOL) injection 30 mg (30 mg Intravenous Given 1/7/22 1445)   vancomycin 2000 mg/500 mL 0.9% NS IVPB ([facility]) (2,000 mg Intravenous New Bag 1/7/22 1634)   clindamycin (CLEOCIN) 900 mg in sodium chloride 0.9% 50 mL IVPB (premix) (0 mg Intravenous Stopped 1/7/22 1634)                     MDM  Number of Diagnoses or Management Options     Amount and/or Complexity of Data Reviewed  Clinical lab tests: ordered and reviewed  Tests in the radiology section of CPT®: reviewed    Risk of Complications, Morbidity, and/or Mortality  Presenting problems: high  Diagnostic procedures: high  Management options: high  General comments: Culture showed sensitivity to the  and vancomycin the patient was given these agents in the emergency department.  The patient's CRP has or than doubled and the patient's sed rate is also increased as his WBC.  The patient will also need control of his type 2 diabetes.  He was given Humalog emergency department.  The patient was agreeable to this plan of treatment radiology results from previously reviewed the patient was agreeable to this plan of treatment        Final diagnoses:   Prepatellar bursitis of right knee   Cellulitis of knee, right   Uncontrolled type 2 diabetes mellitus with hyperglycemia (HCC)       ED Disposition  ED Disposition     ED Disposition Condition Comment    Decision to Admit  Level of Care: Med/Surg [1]   Diagnosis: Prepatellar bursitis of right knee [339401]   Admitting Physician: JUSTINE BOWDEN [[id number]]   Bed Request Comments: cardiac monitor            No follow-up provider specified.       Medication List      No changes were made to your prescriptions during this visit.          Kirby King MD  01/07/22 0039

## 2022-01-08 NOTE — PROGRESS NOTES
"Pharmacy Antimicrobial Dosing Service    Subjective:  Tapan Wilson is a 61 y.o.male admitted with knee pain and swelling. Pharmacy has been consulted to dose Vancomycin for possible bursitis.        Assessment/Plan    1. Day #1 Vancomycin: Goal -600 mcg*h/mL. Vancomycin 2000 mg IV once, followed by 1250 mg IV q12h Peak and trough ordered for 1/8 at 2100 and 1/9 at 0400 respectively.     2. Day #1 Clindamycin: 900 IV q8h    Will continue to monitor drug levels, renal function, culture and sensitivities, and patient clinical status.       Objective:  Relevant clinical data and objective history reviewed:  185.4 cm (73\")   104 kg (230 lb)   Ideal body weight: 79.9 kg (176 lb 2.4 oz)  Adjusted ideal body weight: 89.7 kg (197 lb 11 oz)  Body mass index is 30.34 kg/m².        Results from last 7 days   Lab Units 01/07/22  1447 01/05/22  1327   CREATININE mg/dL 0.66* 0.72*     Estimated Creatinine Clearance: 146.9 mL/min (A) (by C-G formula based on SCr of 0.66 mg/dL (L)).  No intake/output data recorded.    Results from last 7 days   Lab Units 01/07/22  1447   WBC 10*3/mm3 9.60     Temperature    01/07/22 1357   Temp: 97.6 °F (36.4 °C)     Baseline culture/source/susceptibility:  Microbiology Results (last 10 days)       Procedure Component Value - Date/Time    COVID PRE-OP / PRE-PROCEDURE SCREENING ORDER (NO ISOLATION) - Swab, Nasopharynx [[id number]]  (Abnormal) Collected: 01/07/22 1937    Lab Status: Final result Specimen: Swab from Nasopharynx Updated: 01/07/22 2009    Narrative:      The following orders were created for panel order COVID PRE-OP / PRE-PROCEDURE SCREENING ORDER (NO ISOLATION) - Swab, Nasopharynx.  Procedure                               Abnormality         Status                     ---------                               -----------         ------                     COVID-19,CEPHEID/CHARMAINE,CO...[[id number]]  Abnormal            Final result                 Please view results for these tests " on the individual orders.    COVID-19,CEPHEID/CHARMAINE,COR/KATELYN/PAD/BOB IN-HOUSE(OR EMERGENT/ADD-ON),NP SWAB IN TRANSPORT MEDIA 3-4 HR TAT, RT-PCR - Swab, Nasopharynx [[id number]]  (Abnormal) Collected: 01/07/22 1937    Lab Status: Final result Specimen: Swab from Nasopharynx Updated: 01/07/22 2009     COVID19 Detected    Narrative:      Fact sheet for providers: https://www.fda.gov/media/537454/download     Fact sheet for patients: https://www.fda.gov/media/205045/download  Fact sheet for providers: https://www.fda.gov/media/735748/download    Fact sheet for patients: https://www.fda.gov/media/725419/download    Test performed by PCR.    Wound Culture - Wound, Knee, Right [[id number]]  (Abnormal)  (Susceptibility) Collected: 01/05/22 1328    Lab Status: Final result Specimen: Wound from Knee, Right Updated: 01/07/22 1251     Wound Culture Heavy growth (4+) Staphylococcus aureus     Gram Stain Few (2+) WBCs per low power field      Few (2+) Gram positive cocci in clusters    Susceptibility      Staphylococcus aureus  REILLY  Clindamycin  Susceptible  Erythromycin  Resistant  Inducible Clindamycin Resistance  Negative  Oxacillin  Susceptible  Rifampin  Susceptible  Tetracycline  Susceptible  Trimethoprim + Sulfamethoxazole  Susceptible  Vancomycin  Susceptible             Susceptibility Comments       Staphylococcus aureus    This isolate does not demonstrate inducible clindamycin resistance in vitro.                         Anti-Infectives (From admission, onward)      Ordered     Dose/Rate Route Frequency Start Stop    01/07/22 2113  vancomycin 1250 mg/250 mL 0.9% NS IVPB ([facility])        Ordering Provider: Kirby King MD    1,250 mg Intravenous Every 12 Hours 01/08/22 0500 01/13/22 0459    01/07/22 1926  clindamycin (CLEOCIN) 900 mg in sodium chloride 0.9% 50 mL IVPB (premix)        Note to Pharmacy: Had dose in ED   Ordering Provider: Snow Abbasi APRN    900 mg  50 mL/hr over 60 Minutes Intravenous Every 8  Hours 01/08/22 0200 01/11/22 0159    01/07/22 1531  clindamycin (CLEOCIN) 900 mg in sodium chloride 0.9% 50 mL IVPB (premix)        Ordering Provider: Kirby King MD    900 mg  50 mL/hr over 60 Minutes Intravenous Once 01/07/22 1533 01/07/22 1634    01/07/22 1435  vancomycin 2000 mg/500 mL 0.9% NS IVPB ([facility])        Ordering Provider: Kirby King MD    20 mg/kg × 104 kg Intravenous Once 01/07/22 1500 01/07/22 1954            Chey Lazcano, PharmD  01/07/22 21:15 EST

## 2022-01-08 NOTE — CONSULTS
Inpatient Endocrine Consult  Consultation requested by hospitalist team for uncontrolled diabetes  Patient Care Team:  Provider, No Known as PCP - General  You have chosen to receive care through a telehealth visit.  Do you consent to use a video/audio connection for your medical care today? Yes    Chief Complaint: Uncontrolled type 2 diabetes: Visit conducted through phone due to COVID.    HPI: This is a 61-year-old male with history of type 2 diabetes, hyperlipidemia, CAD came in with knee pain with difficulty to bend knee.  He is admitted with prepatellar bursitis of the right knee as well as cellulitis of the right knee and also was diagnosed to have COVID during this visit.  Visit was conducted on the phone with his permission.  Blood sugars been running high.  Patient tells me at home he is on insulin but cannot remember the names and tells me that his blood sugars not been controlled at home.  He is eating well at this time.    Past Medical History:   Diagnosis Date   • Diabetes mellitus (HCC)    • Disease of thyroid gland    • Hyperlipidemia    • Myocardial infarction (HCC)        Social History     Socioeconomic History   • Marital status: Single   Tobacco Use   • Smoking status: Never Smoker   • Smokeless tobacco: Never Used   Vaping Use   • Vaping Use: Never used   Substance and Sexual Activity   • Alcohol use: Yes       History reviewed. No pertinent family history.    No Known Allergies    ROS:   Constitutional:  Denies fatigue, tiredness.    Eyes:  Denies change in visual acuity   HENT:  Denies nasal congestion or sore throat   Respiratory: denies cough, shortness of breath.   Cardiovascular:  denies chest pain, edema   GI:  Denies abdominal pain, nausea, vomiting.   :  Denies Polyuria and Polydipsia  Musculoskeletal:  Denies back pain or joint pain   Integument:  Denies dry skin, rash   Neurologic:  Denies headache, focal weakness or sensory changes   Endocrine:  Denies polyuria or polydipsia    Psychiatric:  Denies depression or anxiety      Vitals:    01/08/22 0354   BP: 129/81   Pulse: 71   Resp: 18   Temp: 98.5 °F (36.9 °C)   SpO2: 95%      Body mass index is 29.96 kg/m².     Physical Exam:  GEN: NAD, conversant  PSYCH: AOX3, appropriate mood, affect normal      Results Review:     I reviewed the patient's new clinical results.    Lab Results   Component Value Date    GLUCOSE 280 (H) 01/08/2022    BUN 14 01/08/2022    CREATININE 0.64 (L) 01/08/2022    EGFRIFNONA 127 01/08/2022    BCR 21.9 01/08/2022    K 4.3 01/08/2022    CO2 24.0 01/08/2022    CALCIUM 8.7 01/08/2022    ALBUMIN 3.90 01/07/2022    AST 6 01/07/2022    ALT 8 01/07/2022         Lab Results   Component Value Date    CREATININE 0.64 (L) 01/08/2022     Results from last 7 days   Lab Units 01/08/22  0724   GLUCOSE mg/dL 259*       Medication Review: Reviewed.       Current Facility-Administered Medications:   •  acetaminophen (TYLENOL) tablet 650 mg, 650 mg, Oral, Q4H PRN **OR** acetaminophen (TYLENOL) 160 MG/5ML solution 650 mg, 650 mg, Oral, Q4H PRN **OR** acetaminophen (TYLENOL) suppository 650 mg, 650 mg, Rectal, Q4H PRN, Snow Abbasi, APRN  •  sennosides-docusate (PERICOLACE) 8.6-50 MG per tablet 2 tablet, 2 tablet, Oral, BID, 2 tablet at 01/08/22 0958 **AND** polyethylene glycol (MIRALAX) packet 17 g, 17 g, Oral, Daily PRN **AND** bisacodyl (DULCOLAX) EC tablet 5 mg, 5 mg, Oral, Daily PRN **AND** bisacodyl (DULCOLAX) suppository 10 mg, 10 mg, Rectal, Daily PRN, Snow Abbasi, APRN  •  clindamycin (CLEOCIN) 900 mg in sodium chloride 0.9% 50 mL IVPB (premix), 900 mg, Intravenous, Q8H, Snow Abbasi APRN, Last Rate: 50 mL/hr at 01/08/22 0959, 900 mg at 01/08/22 0959  •  dextrose (D50W) (25 g/50 mL) IV injection 25 g, 25 g, Intravenous, Q15 Min PRN, Snow Abbasi APRN  •  dextrose (GLUTOSE) oral gel 15 g, 15 g, Oral, Q15 Min PRN, Snow Abbasi APRN  •  enoxaparin (LOVENOX) syringe 40 mg, 40 mg,  Subcutaneous, Q24H, Snow Abbasi, APRN  •  glucagon (human recombinant) (GLUCAGEN DIAGNOSTIC) 1 mg in sterile water (preservative free) 1 mL injection, 1 mg, Subcutaneous, PRN, Snow Abbasi, APRN  •  insulin lispro (ADMELOG) injection 0-9 Units, 0-9 Units, Subcutaneous, TID AC, 6 Units at 01/08/22 0958 **AND** insulin lispro (ADMELOG) injection 0-9 Units, 0-9 Units, Subcutaneous, PRN, Snow Abbasi, APRN  •  melatonin tablet 5 mg, 5 mg, Oral, Nightly PRN, Snow Abbasi, APRN  •  nitroglycerin (NITROSTAT) SL tablet 0.4 mg, 0.4 mg, Sublingual, Q5 Min PRN, Snow Abbasi, APRN  •  ondansetron (ZOFRAN) tablet 4 mg, 4 mg, Oral, Q6H PRN **OR** ondansetron (ZOFRAN) injection 4 mg, 4 mg, Intravenous, Q6H PRN, Snow Abbasi, APRN  •  oxyCODONE (ROXICODONE) immediate release tablet 10 mg, 10 mg, Oral, Q4H PRN, Ramonita Santos APRN, 10 mg at 01/08/22 0531  •  Pharmacy to dose vancomycin, , Does not apply, Continuous PRN, Kirby King MD  •  sodium chloride 0.9 % flush 3 mL, 3 mL, Intravenous, Q12H, Snow Abbasi APRN, 3 mL at 01/08/22 0959  •  sodium chloride 0.9 % flush 3-10 mL, 3-10 mL, Intravenous, PRN, Snow Abbasi, APRN  •  sodium chloride 0.9 % infusion, 100 mL/hr, Intravenous, Continuous, Snow Abbasi, APRN, Last Rate: 100 mL/hr at 01/08/22 1002, 100 mL/hr at 01/08/22 1002  •  vancomycin 1250 mg/250 mL 0.9% NS IVPB ([facility]), 1,250 mg, Intravenous, Q12H, Kirby King MD, 1,250 mg at 01/08/22 0402    Assessment/Plan   1.  Diabetes mellitus type 2: Uncontrolled with significant hyperglycemia, could be exacerbation due to acute sickness as well.  We will add basal bolus insulin therapy with Lantus and Humalog along with Humalog sliding scale and follow blood sugars and make further adjustments as needed.  2.  Prepatellar bursitis of the right knee/cellulitis right knee: Followed by attending physician.  Currently on antibiotics.  3.   COVID-positive: Per patient he is asymptomatic at this time.    Thank you very much for the consultation.             Derrek Chang MD FACE.

## 2022-01-08 NOTE — PLAN OF CARE
Continue to monitor and assess pt.   Pt is resting.   Problem: Adult Inpatient Plan of Care  Goal: Plan of Care Review  Outcome: Ongoing, Progressing  Flowsheets (Taken 1/8/2022 0259 by Tiffany Wilks, RN)  Progress: improving  Plan of Care Reviewed With: patient  Outcome Summary: Antibiotics given overnight.  Endocrinology to see in am.  Goal: Patient-Specific Goal (Individualized)  Outcome: Ongoing, Progressing  Goal: Absence of Hospital-Acquired Illness or Injury  Outcome: Ongoing, Progressing  Intervention: Identify and Manage Fall Risk  Flowsheets (Taken 1/8/2022 0500 by Tiffany Wilks, RN)  Safety Promotion/Fall Prevention: safety round/check completed  Intervention: Prevent Skin Injury  Flowsheets (Taken 1/8/2022 0715)  Body Position:   position changed independently   position maintained  Skin Protection:   adhesive use limited   skin-to-device areas padded  Intervention: Prevent and Manage VTE (venous thromboembolism) Risk  Flowsheets (Taken 1/8/2022 0715)  VTE Prevention/Management: (see MAR) other (see comments)  Intervention: Prevent Infection  Flowsheets (Taken 1/8/2022 0715)  Infection Prevention:   hand hygiene promoted   personal protective equipment utilized   rest/sleep promoted  Goal: Optimal Comfort and Wellbeing  Outcome: Ongoing, Progressing  Intervention: Provide Person-Centered Care  Flowsheets (Taken 1/8/2022 0715)  Trust Relationship/Rapport:   questions answered   questions encouraged   care explained  Goal: Readiness for Transition of Care  Outcome: Ongoing, Progressing  Intervention: Mutually Develop Transition Plan  Flowsheets  Taken 1/7/2022 2311 by Tiffany Wilks RN  Transportation Anticipated: family or friend will provide  Patient/Family Anticipated Services at Transition: none  Patient/Family Anticipates Transition to: home  Taken 1/7/2022 2307 by Tiffany Wilks RN  Equipment Currently Used at Home: none     Problem: Skin or Soft Tissue Infection  Goal: Infection  Symptom Resolution  Outcome: Ongoing, Progressing  Intervention: Provide Meticulous Infection Site Care  Flowsheets (Taken 1/8/2022 0715)  Fever Reduction/Comfort Measures: fluid intake increased  Infection Prevention:   hand hygiene promoted   personal protective equipment utilized   rest/sleep promoted  Intervention: Minimize and Manage Infection Progression  Flowsheets (Taken 1/7/2022 2308 by Tiffany Wilks RN)  Isolation Precautions: droplet precautions maintained     Problem: Glycemic Control Impaired  Goal: Blood Glucose Level Within Desired Range  Outcome: Ongoing, Progressing  Intervention: Optimize Glycemic Control  Flowsheets (Taken 1/8/2022 0715)  Glycemic Management: blood glucose monitoring   Goal Outcome Evaluation:

## 2022-01-08 NOTE — PROGRESS NOTES
[facility] Medicine Services Daily Progress Note    Patient Name: Tapan Wilson  : 1960  MRN: [id number]  Primary Care Physician:  Provider, No Known  Date of admission: 2022      Subjective      Chief Complaint: Knee pain    Subjective  Patient Reports still having some knee pain but improved after drainage.  Denies any nausea vomiting.    Review of Systems   All other systems reviewed and are negative.         Objective      Vitals:   Temp:  [97 °F (36.1 °C)-98.5 °F (36.9 °C)] 97.8 °F (36.6 °C)  Heart Rate:  [65-85] 85  Resp:  [16-18] 18  BP: (113-160)/(72-97) 157/77    Physical Exam  Vitals and nursing note reviewed.   Constitutional:       General: He is not in acute distress.     Appearance: Normal appearance. He is well-developed. He is not ill-appearing, toxic-appearing or diaphoretic.   HENT:      Head: Normocephalic and atraumatic.      Right Ear: Ear canal and external ear normal.      Left Ear: Ear canal and external ear normal.      Nose: Nose normal. No congestion or rhinorrhea.      Mouth/Throat:      Mouth: Mucous membranes are moist.      Pharynx: No oropharyngeal exudate.   Eyes:      General: No scleral icterus.        Right eye: No discharge.         Left eye: No discharge.      Extraocular Movements: Extraocular movements intact.      Conjunctiva/sclera: Conjunctivae normal.      Pupils: Pupils are equal, round, and reactive to light.   Neck:      Thyroid: No thyromegaly.      Vascular: No carotid bruit or JVD.      Trachea: No tracheal deviation.   Cardiovascular:      Rate and Rhythm: Normal rate and regular rhythm.      Pulses: Normal pulses.      Heart sounds: Normal heart sounds. No murmur heard.  No friction rub. No gallop.    Pulmonary:      Effort: Pulmonary effort is normal. No respiratory distress.      Breath sounds: Normal breath sounds. No stridor. No wheezing, rhonchi or rales.   Chest:      Chest wall: No tenderness.   Abdominal:       General: Bowel sounds are normal. There is no distension.      Palpations: Abdomen is soft. There is no mass.      Tenderness: There is no abdominal tenderness. There is no guarding or rebound.      Hernia: No hernia is present.   Musculoskeletal:         General: No swelling, tenderness, deformity or signs of injury. Normal range of motion.      Cervical back: Normal range of motion and neck supple. No rigidity. No muscular tenderness.      Right lower leg: No edema.      Left lower leg: No edema.   Lymphadenopathy:      Cervical: No cervical adenopathy.   Skin:     General: Skin is warm and dry.      Coloration: Skin is not jaundiced or pale.      Findings: No bruising, erythema or rash.   Neurological:      General: No focal deficit present.      Mental Status: He is alert and oriented to person, place, and time. Mental status is at baseline.      Cranial Nerves: No cranial nerve deficit.      Sensory: No sensory deficit.      Motor: No weakness or abnormal muscle tone.      Coordination: Coordination normal.   Psychiatric:         Mood and Affect: Mood normal.         Behavior: Behavior normal.         Thought Content: Thought content normal.         Judgment: Judgment normal.             Result Review    Result Review:  I have personally reviewed the results from the time of this admission to 1/8/2022 16:19 EST and agree with these findings:  [x]  Laboratory  [x]  Microbiology  []  Radiology  []  EKG/Telemetry   []  Cardiology/Vascular   []  Pathology  []  Old records  []  Other:  Most notable findings include:           Assessment/Plan      Brief Patient Summary:  Tapan Wilson is a 61 y.o. male who       clindamycin, 900 mg, Intravenous, Q8H  enoxaparin, 40 mg, Subcutaneous, Q24H  insulin glargine, 30 Units, Subcutaneous, QAM  insulin lispro, 0-9 Units, Subcutaneous, TID AC  insulin lispro, 10 Units, Subcutaneous, TID With Meals  senna-docusate sodium, 2 tablet, Oral, BID  sodium chloride, 3 mL,  "Intravenous, Q12H  vancomycin, 1,250 mg, Intravenous, Q12H       Pharmacy to dose vancomycin,   sodium chloride, 100 mL/hr, Last Rate: 100 mL/hr (01/08/22 1309)         Active Hospital Problems:  Active Hospital Problems    Diagnosis    • Prepatellar bursitis of right knee    • Cellulitis of right knee    • Type 2 diabetes mellitus with hyperglycemia (HCC)      Plan:     Prepatellar bursitis of the right knee now with cellulitis culture dated 1/5/2022 grew 4+ staph aureus, continue vancomycin and clindamycin, follow cultures, monitor clinical progress     Type 2 diabetes mellitus with hyperglycemia 383 was 411 2 days ago, Endo consulted, will follow the recommendation.     Coronary artery disease patient reports status post PCI at [place] about 11 years ago no chest pain stable continuous cardiac monitoring, home meds unverified at this time reorder pending verification from pharmacy was on Plavix in past and statin     Hyperlipidemia, home meds unverified at this time reorder pending     Anxiety depression, home meds unverified at this time reorder pending verification pharmacy     Hypothyroidism, home meds unverified at this time reorder pending verification from pharmacy     Obesity BMI 30.34 lifestyle management education     Alcohol use reports \"1 shot not every night\" blood alcohol ordered and pending we will order CIWA if indicated        DVT prophylaxis:  Medical DVT prophylaxis orders are present.     CODE STATUS:    Code Status (Patient has no pulse and is not breathing): CPR (Attempt to Resuscitate)  Medical Interventions (Patient has pulse or is breathing): Full Support    DVT prophylaxis:  Medical DVT prophylaxis orders are present.    CODE STATUS:    Code Status (Patient has no pulse and is not breathing): CPR (Attempt to Resuscitate)  Medical Interventions (Patient has pulse or is breathing): Full Support      Disposition:  I expect patient to be discharged observation.    This patient has been examined " wearing appropriate Personal Protective Equipment and discussed with hospital infection control department. 01/08/22      Electronically signed by Jany Graham MD, 01/08/22, 16:19 EST.  [facility] Hospitalist Team

## 2022-01-08 NOTE — H&P
"    [facility] Medicine Services      Patient Name: Tapan Wilson  : 1960  MRN: [id number]  Primary Care Physician:  Provider, No Known  Date of admission: 2022      Subjective      Chief Complaint: Right knee pain  History of Present Illness: Tapan Wilson is a 61 y.o. male who presented to [facility] on 2022 complaining of right knee swelling, pain and erythema and edema.  Patient was seen in the emergency department 2 days ago.  X-ray at that time showed:    \"Abnormal prepatellar and infrapatellar superficial soft tissue swelling  suggestive of prepatellar bursitis. No underlying osseous abnormality.  \"    Denies fevers chills chest pain or shortness of air.  He reports it is difficult to bend his knee.  Wound culture done on 2022 grew 4+ staph aureus and he was contacted from hospital.  He returns today for treatment.  Repeat wound culture and blood cultures ordered and pending he was placed on IV vancomycin pharmacy to dose and IV clindamycin susceptible to staph aureus.  Past medical history also includes uncontrolled diabetes mellitus type 2 patient reports he does not have a working glucometer and is \"do not think my metformin or my meds are working for me\".  Endocrinology consulted and diabetes educator consulted serum glucose 383 was 411 2 days ago, 8 units regular insulin given in ED.  Past medical history includes coronary artery disease status post stent 11 years ago at [facility] denies any problems, anxiety/depression, hypothyroidism, obesity,.  Denies smoking.  Reports drinks \"a shot but not every other day\".  Blood alcohol level ordered and pending precautionary.  Patient will be admitted for further evaluation and treatment patient denies COVID-vaccine COVID-19 ordered and pending exhibits no symptoms.  Review of Systems   Constitutional: Negative.   HENT: Negative.    Eyes: Negative.    Cardiovascular: Negative.    Respiratory: " Negative.    Endocrine: Negative.    Hematologic/Lymphatic: Negative.    Skin: Negative.    Musculoskeletal: Positive for joint pain.   Gastrointestinal: Negative.    Genitourinary: Negative.    Neurological: Negative.    Psychiatric/Behavioral: Negative.    Allergic/Immunologic: Negative.    All other systems reviewed and are negative.      Personal History     Past Medical History:   Diagnosis Date   • Diabetes mellitus (HCC)    • Disease of thyroid gland    • Hyperlipidemia    • Myocardial infarction (HCC)        Past Surgical History:   Procedure Laterality Date   • ANKLE SURGERY Right    • CARDIAC CATHETERIZATION     • HIP SURGERY Right    • ROTATOR CUFF REPAIR Right        Family History: family history is not on file. Otherwise pertinent FHx was reviewed and not pertinent to current issue.    Social History:  reports that he has never smoked. He has never used smokeless tobacco. He reports current alcohol use.    Home Medications:  Prior to Admission Medications     Prescriptions Last Dose Informant Patient Reported? Taking?    amitriptyline (ELAVIL) 10 MG tablet   Yes No    atorvastatin (LIPITOR) 20 MG tablet   Yes No    atorvastatin 20 mg tablet    clopidogrel (Plavix) 75 MG tablet   Yes No    PLAVIX 75 MG TABS    dapagliflozin-metformin HCl ER (Xigduo XR)  MG tablet   Yes No    Daily.    glipizide (GLUCOTROL) 10 MG tablet   Yes No    glipizide 10 mg tablet    HYDROcodone-acetaminophen (NORCO)  MG per tablet   Yes No    HYDROCODONE-ACETAMINOPHEN  MG TABS    insulin detemir (LEVEMIR) 100 UNIT/ML injection   Yes No    Levemir FlexTouch U-100 Insulin 100 unit/mL (3 mL) subcutaneous pen    levothyroxine (SYNTHROID, LEVOTHROID) 50 MCG tablet   Yes No    levothyroxine 50 mcg tablet   TAKE 1 TABLET BY MOUTH EVERY DAY    Liraglutide (Victoza) 18 MG/3ML solution pen-injector injection   Yes No    VICTOZA 18 MG/3ML SOPN    metFORMIN (GLUCOPHAGE) 1000 MG tablet   Yes No    Take 1,000 mg by mouth.     "        Allergies:  No Known Allergies    Objective      Vitals:   Temp:  [97.6 °F (36.4 °C)] 97.6 °F (36.4 °C)  Heart Rate:  [65-81] 77  Resp:  [16] 16  BP: (113-152)/(64-87) 137/80    Physical Exam  Vitals reviewed.   Constitutional:       Appearance: Normal appearance. He is obese.   HENT:      Head: Normocephalic and atraumatic.      Right Ear: External ear normal.      Left Ear: External ear normal.      Nose: Nose normal.      Mouth/Throat:      Mouth: Mucous membranes are moist.   Eyes:      Extraocular Movements: Extraocular movements intact.   Cardiovascular:      Rate and Rhythm: Normal rate and regular rhythm.      Pulses: Normal pulses.      Heart sounds: Normal heart sounds.   Pulmonary:      Effort: Pulmonary effort is normal.      Breath sounds: Normal breath sounds.   Abdominal:      Palpations: Abdomen is soft.   Genitourinary:     Comments: deferred  Musculoskeletal:         General: Swelling and tenderness present.      Cervical back: Normal range of motion and neck supple.      Comments: R prepatellar erythema edema and tenderness   Skin:     General: Skin is warm and dry.      Findings: Erythema present.   Neurological:      General: No focal deficit present.      Mental Status: He is alert and oriented to person, place, and time.   Psychiatric:         Mood and Affect: Mood normal.         Behavior: Behavior normal.         Thought Content: Thought content normal.         Judgment: Judgment normal.         Result Review    Result Review:  I have personally reviewed the results from the time of this admission to 1/7/2022 20:38 EST and agree with these findings:  [x]  Laboratory  [x]  Microbiology  [x]  Radiology  []  EKG/Telemetry   []  Cardiology/Vascular   []  Pathology  [x]  Old records  []  Other:  Most notable findings include serum glucose 383 sodium 135, CRP 12.5, sed rate 41, right knee x-ray per radiology showing \"abnormal prepatellar and intra patellar superficial soft tissue swelling " "suggestive of prepatellar bursitis.  No underlying osseous abnormality\".  Dated January 5, 2022      Assessment/Plan        Active Hospital Problems:  Active Hospital Problems    Diagnosis    • Prepatellar bursitis of right knee    • Cellulitis of right knee    • Type 2 diabetes mellitus with hyperglycemia (HCC)      Plan:    Prepatellar bursitis of the right knee now with cellulitis culture dated 1/5/2022 grew 4+ staph aureus, now on IV clindamycin and IV vancomycin pharmacy to dose with repeat wound and blood cultures pending no osseous involvement per x-ray dated 1/5/2022, normal saline at 100 cc/h    Type 2 diabetes mellitus with hyperglycemia 383 was 411 2 days ago, reports \"I do not think my medications are working for me\", requested endocrinology consult and diabetes educator does not check glucose at home low concentrated sweet diet, normal saline at 100 cc/h add SSI as needed and Accu-Cheks AC at bedtime A1c in a.m. Home meds unverified at this time reorder pending verification from pharmacy    Coronary artery disease patient reports status post PCI at [place] about 11 years ago no chest pain stable continuous cardiac monitoring, home meds unverified at this time reorder pending verification from pharmacy was on Plavix in past and statin    Hyperlipidemia, home meds unverified at this time reorder pending    Anxiety depression, home meds unverified at this time reorder pending verification pharmacy    Hypothyroidism, home meds unverified at this time reorder pending verification from pharmacy    Obesity BMI 30.34 lifestyle management education    Alcohol use reports \"1 shot not every night\" blood alcohol ordered and pending we will order CIWA if indicated      DVT prophylaxis:  Medical DVT prophylaxis orders are present.    CODE STATUS:    Code Status (Patient has no pulse and is not breathing): CPR (Attempt to Resuscitate)  Medical Interventions (Patient has pulse or is breathing): Full Support    Admission " Status:  I believe this patient meets observation status.    I discussed the patient's findings and my recommendations with patient.    This patient has been . 01/07/22      Signature: Electronically signed by JOHANNY Osman, 01/07/22, 8:42 PM EST.

## 2022-01-09 LAB
ANION GAP SERPL CALCULATED.3IONS-SCNC: 12 MMOL/L (ref 5–15)
BASOPHILS # BLD AUTO: 0.1 10*3/MM3 (ref 0–0.2)
BASOPHILS NFR BLD AUTO: 0.9 % (ref 0–1.5)
BUN SERPL-MCNC: 12 MG/DL (ref 8–23)
BUN/CREAT SERPL: 17.6 (ref 7–25)
CALCIUM SPEC-SCNC: 9 MG/DL (ref 8.6–10.5)
CHLORIDE SERPL-SCNC: 104 MMOL/L (ref 98–107)
CO2 SERPL-SCNC: 23 MMOL/L (ref 22–29)
CREAT SERPL-MCNC: 0.68 MG/DL (ref 0.76–1.27)
DEPRECATED RDW RBC AUTO: 37.2 FL (ref 37–54)
EOSINOPHIL # BLD AUTO: 0.1 10*3/MM3 (ref 0–0.4)
EOSINOPHIL NFR BLD AUTO: 2 % (ref 0.3–6.2)
ERYTHROCYTE [DISTWIDTH] IN BLOOD BY AUTOMATED COUNT: 12.5 % (ref 12.3–15.4)
GFR SERPL CREATININE-BSD FRML MDRD: 118 ML/MIN/1.73
GLUCOSE BLDC GLUCOMTR-MCNC: 187 MG/DL (ref 70–105)
GLUCOSE BLDC GLUCOMTR-MCNC: 193 MG/DL (ref 70–105)
GLUCOSE BLDC GLUCOMTR-MCNC: 203 MG/DL (ref 70–105)
GLUCOSE BLDC GLUCOMTR-MCNC: 277 MG/DL (ref 70–105)
GLUCOSE SERPL-MCNC: 296 MG/DL (ref 65–99)
HCT VFR BLD AUTO: 38.5 % (ref 37.5–51)
HGB BLD-MCNC: 13.8 G/DL (ref 13–17.7)
LYMPHOCYTES # BLD AUTO: 1.9 10*3/MM3 (ref 0.7–3.1)
LYMPHOCYTES NFR BLD AUTO: 26.8 % (ref 19.6–45.3)
MCH RBC QN AUTO: 30.6 PG (ref 26.6–33)
MCHC RBC AUTO-ENTMCNC: 35.7 G/DL (ref 31.5–35.7)
MCV RBC AUTO: 85.6 FL (ref 79–97)
MONOCYTES # BLD AUTO: 0.4 10*3/MM3 (ref 0.1–0.9)
MONOCYTES NFR BLD AUTO: 5.9 % (ref 5–12)
NEUTROPHILS NFR BLD AUTO: 4.6 10*3/MM3 (ref 1.7–7)
NEUTROPHILS NFR BLD AUTO: 64.4 % (ref 42.7–76)
NRBC BLD AUTO-RTO: 0 /100 WBC (ref 0–0.2)
PLATELET # BLD AUTO: 266 10*3/MM3 (ref 140–450)
PMV BLD AUTO: 8.5 FL (ref 6–12)
POTASSIUM SERPL-SCNC: 3.9 MMOL/L (ref 3.5–5.2)
RBC # BLD AUTO: 4.5 10*6/MM3 (ref 4.14–5.8)
SODIUM SERPL-SCNC: 139 MMOL/L (ref 136–145)
VANCOMYCIN TROUGH SERPL-MCNC: 4.5 MCG/ML (ref 5–20)
WBC NRBC COR # BLD: 7.2 10*3/MM3 (ref 3.4–10.8)

## 2022-01-09 PROCEDURE — 25010000002 VANCOMYCIN 10 G RECONSTITUTED SOLUTION: Performed by: HOSPITALIST

## 2022-01-09 PROCEDURE — 82962 GLUCOSE BLOOD TEST: CPT

## 2022-01-09 PROCEDURE — 25010000002 VANCOMYCIN 1 G RECONSTITUTED SOLUTION: Performed by: HOSPITALIST

## 2022-01-09 PROCEDURE — 80048 BASIC METABOLIC PNL TOTAL CA: CPT | Performed by: HOSPITALIST

## 2022-01-09 PROCEDURE — 80202 ASSAY OF VANCOMYCIN: CPT | Performed by: EMERGENCY MEDICINE

## 2022-01-09 PROCEDURE — 96372 THER/PROPH/DIAG INJ SC/IM: CPT

## 2022-01-09 PROCEDURE — 85025 COMPLETE CBC W/AUTO DIFF WBC: CPT | Performed by: HOSPITALIST

## 2022-01-09 PROCEDURE — 63710000001 INSULIN LISPRO (HUMAN) PER 5 UNITS: Performed by: INTERNAL MEDICINE

## 2022-01-09 PROCEDURE — 99232 SBSQ HOSP IP/OBS MODERATE 35: CPT | Performed by: INTERNAL MEDICINE

## 2022-01-09 PROCEDURE — 25010000002 ENOXAPARIN PER 10 MG: Performed by: NURSE PRACTITIONER

## 2022-01-09 PROCEDURE — 99225 PR SBSQ OBSERVATION CARE/DAY 25 MINUTES: CPT | Performed by: HOSPITALIST

## 2022-01-09 PROCEDURE — 96361 HYDRATE IV INFUSION ADD-ON: CPT

## 2022-01-09 PROCEDURE — 63710000001 INSULIN GLARGINE PER 5 UNITS: Performed by: INTERNAL MEDICINE

## 2022-01-09 PROCEDURE — G0378 HOSPITAL OBSERVATION PER HR: HCPCS

## 2022-01-09 PROCEDURE — 63710000001 INSULIN LISPRO (HUMAN) PER 5 UNITS: Performed by: NURSE PRACTITIONER

## 2022-01-09 PROCEDURE — 96366 THER/PROPH/DIAG IV INF ADDON: CPT

## 2022-01-09 RX ORDER — INSULIN LISPRO 100 [IU]/ML
11 INJECTION, SOLUTION INTRAVENOUS; SUBCUTANEOUS
Status: DISCONTINUED | OUTPATIENT
Start: 2022-01-09 | End: 2022-01-10

## 2022-01-09 RX ORDER — VANCOMYCIN 1.75 GRAM/500 ML IN 0.9 % SODIUM CHLORIDE INTRAVENOUS
1750 EVERY 12 HOURS
Status: DISCONTINUED | OUTPATIENT
Start: 2022-01-09 | End: 2022-01-09

## 2022-01-09 RX ADMIN — ENOXAPARIN SODIUM 40 MG: 40 INJECTION SUBCUTANEOUS at 16:49

## 2022-01-09 RX ADMIN — SODIUM CHLORIDE, PRESERVATIVE FREE 3 ML: 5 INJECTION INTRAVENOUS at 21:39

## 2022-01-09 RX ADMIN — OXYCODONE 10 MG: 5 TABLET ORAL at 18:04

## 2022-01-09 RX ADMIN — VANCOMYCIN HYDROCHLORIDE 1750 MG: 1 INJECTION, POWDER, LYOPHILIZED, FOR SOLUTION INTRAVENOUS at 08:28

## 2022-01-09 RX ADMIN — CLINDAMYCIN PHOSPHATE 900 MG: 900 INJECTION, SOLUTION INTRAVENOUS at 08:30

## 2022-01-09 RX ADMIN — INSULIN GLARGINE 30 UNITS: 100 INJECTION, SOLUTION SUBCUTANEOUS at 08:29

## 2022-01-09 RX ADMIN — SODIUM CHLORIDE, PRESERVATIVE FREE 3 ML: 5 INJECTION INTRAVENOUS at 08:29

## 2022-01-09 RX ADMIN — CLINDAMYCIN PHOSPHATE 900 MG: 900 INJECTION, SOLUTION INTRAVENOUS at 18:04

## 2022-01-09 RX ADMIN — OXYCODONE 10 MG: 5 TABLET ORAL at 23:27

## 2022-01-09 RX ADMIN — SODIUM CHLORIDE 100 ML/HR: 9 INJECTION, SOLUTION INTRAVENOUS at 12:34

## 2022-01-09 RX ADMIN — DOCUSATE SODIUM AND SENNOSIDES 2 TABLET: 8.6; 5 TABLET, FILM COATED ORAL at 21:39

## 2022-01-09 RX ADMIN — SODIUM CHLORIDE 100 ML/HR: 9 INJECTION, SOLUTION INTRAVENOUS at 23:27

## 2022-01-09 RX ADMIN — INSULIN LISPRO 4 UNITS: 100 INJECTION, SOLUTION INTRAVENOUS; SUBCUTANEOUS at 08:29

## 2022-01-09 RX ADMIN — INSULIN LISPRO 10 UNITS: 100 INJECTION, SOLUTION INTRAVENOUS; SUBCUTANEOUS at 13:46

## 2022-01-09 RX ADMIN — OXYCODONE 10 MG: 5 TABLET ORAL at 08:30

## 2022-01-09 RX ADMIN — INSULIN LISPRO 9 UNITS: 100 INJECTION, SOLUTION INTRAVENOUS; SUBCUTANEOUS at 18:03

## 2022-01-09 RX ADMIN — OXYCODONE 10 MG: 5 TABLET ORAL at 13:45

## 2022-01-09 RX ADMIN — INSULIN LISPRO 10 UNITS: 100 INJECTION, SOLUTION INTRAVENOUS; SUBCUTANEOUS at 08:30

## 2022-01-09 RX ADMIN — CLINDAMYCIN PHOSPHATE 900 MG: 900 INJECTION, SOLUTION INTRAVENOUS at 02:13

## 2022-01-09 RX ADMIN — INSULIN LISPRO 11 UNITS: 100 INJECTION, SOLUTION INTRAVENOUS; SUBCUTANEOUS at 18:04

## 2022-01-09 RX ADMIN — INSULIN LISPRO 4 UNITS: 100 INJECTION, SOLUTION INTRAVENOUS; SUBCUTANEOUS at 13:45

## 2022-01-09 RX ADMIN — DOCUSATE SODIUM AND SENNOSIDES 2 TABLET: 8.6; 5 TABLET, FILM COATED ORAL at 08:29

## 2022-01-09 NOTE — PROGRESS NOTES
[facility] Medicine Services Daily Progress Note    Patient Name: Tapan Wilson  : 1960  MRN: [id number]  Primary Care Physician:  Provider, No Known  Date of admission: 2022      Subjective      Chief Complaint: Knee pain    Subjective  Patient redness and swelling has improved but still there, denies for any chest pain, no nausea or vomiting.    Review of Systems   All other systems reviewed and are negative.         Objective      Vitals:   Temp:  [97.5 °F (36.4 °C)-99.3 °F (37.4 °C)] 97.5 °F (36.4 °C)  Heart Rate:  [60-69] 68  Resp:  [17-18] 18  BP: (109-151)/(70-86) 109/70    Physical Exam  Vitals and nursing note reviewed.   Constitutional:       General: He is not in acute distress.     Appearance: Normal appearance. He is well-developed. He is not ill-appearing, toxic-appearing or diaphoretic.   HENT:      Head: Normocephalic and atraumatic.      Right Ear: Ear canal and external ear normal.      Left Ear: Ear canal and external ear normal.      Nose: Nose normal. No congestion or rhinorrhea.      Mouth/Throat:      Mouth: Mucous membranes are moist.      Pharynx: No oropharyngeal exudate.   Eyes:      General: No scleral icterus.        Right eye: No discharge.         Left eye: No discharge.      Extraocular Movements: Extraocular movements intact.      Conjunctiva/sclera: Conjunctivae normal.      Pupils: Pupils are equal, round, and reactive to light.   Neck:      Thyroid: No thyromegaly.      Vascular: No carotid bruit or JVD.      Trachea: No tracheal deviation.   Cardiovascular:      Rate and Rhythm: Normal rate and regular rhythm.      Pulses: Normal pulses.      Heart sounds: Normal heart sounds. No murmur heard.  No friction rub. No gallop.    Pulmonary:      Effort: Pulmonary effort is normal. No respiratory distress.      Breath sounds: Normal breath sounds. No stridor. No wheezing, rhonchi or rales.   Chest:      Chest wall: No tenderness.   Abdominal:       General: Bowel sounds are normal. There is no distension.      Palpations: Abdomen is soft. There is no mass.      Tenderness: There is no abdominal tenderness. There is no guarding or rebound.      Hernia: No hernia is present.   Musculoskeletal:         General: No swelling, tenderness, deformity or signs of injury. Normal range of motion.      Cervical back: Normal range of motion and neck supple. No rigidity. No muscular tenderness.      Right lower leg: No edema.      Left lower leg: No edema.   Lymphadenopathy:      Cervical: No cervical adenopathy.   Skin:     General: Skin is warm and dry.      Coloration: Skin is not jaundiced or pale.      Findings: No bruising, erythema or rash.   Neurological:      General: No focal deficit present.      Mental Status: He is alert and oriented to person, place, and time. Mental status is at baseline.      Cranial Nerves: No cranial nerve deficit.      Sensory: No sensory deficit.      Motor: No weakness or abnormal muscle tone.      Coordination: Coordination normal.   Psychiatric:         Mood and Affect: Mood normal.         Behavior: Behavior normal.         Thought Content: Thought content normal.         Judgment: Judgment normal.             Result Review    Result Review:  I have personally reviewed the results from the time of this admission to 1/9/2022 14:50 EST and agree with these findings:  [x]  Laboratory  [x]  Microbiology  []  Radiology  []  EKG/Telemetry   []  Cardiology/Vascular   []  Pathology  []  Old records  []  Other:  Most notable findings include:           Assessment/Plan      Brief Patient Summary:  Tapan Wilson is a 61 y.o. male who       clindamycin, 900 mg, Intravenous, Q8H  enoxaparin, 40 mg, Subcutaneous, Q24H  [START ON 1/10/2022] insulin glargine, 35 Units, Subcutaneous, QAM  insulin lispro, 0-9 Units, Subcutaneous, TID AC  insulin lispro, 11 Units, Subcutaneous, TID With Meals  senna-docusate sodium, 2 tablet, Oral, BID  sodium  "chloride, 3 mL, Intravenous, Q12H       Pharmacy to dose vancomycin,   sodium chloride, 100 mL/hr, Last Rate: 100 mL/hr (01/09/22 1234)         Active Hospital Problems:  Active Hospital Problems    Diagnosis    • Prepatellar bursitis of right knee    • Cellulitis of right knee    • Type 2 diabetes mellitus with hyperglycemia (HCC)      Plan:     Prepatellar bursitis of the right knee now with cellulitis culture dated 1/5/2022 grew 4+ staph aureus, d/c vancomycin, continue clindamycin, MRI knee to evaluate for any occult abscess requiring intervention. Ortho and ID consulted.     Type 2 diabetes mellitus with hyperglycemia 383 was 411 2 days ago, Endo consulted, will follow the recommendation.     Coronary artery disease patient reports status post PCI at [facility] about 11 years ago no chest pain stable continuous cardiac monitoring, home meds unverified at this time reorder pending verification from pharmacy was on Plavix in past and statin     Hyperlipidemia, home meds unverified at this time reorder pending     Anxiety depression, home meds unverified at this time reorder pending verification pharmacy     Hypothyroidism, home meds unverified at this time reorder pending verification from pharmacy     Obesity BMI 30.34 lifestyle management education     Alcohol use reports \"1 shot not every night\" blood alcohol ordered and pending we will order CIWA if indicated        DVT prophylaxis:  Medical DVT prophylaxis orders are present.     CODE STATUS:    Code Status (Patient has no pulse and is not breathing): CPR (Attempt to Resuscitate)  Medical Interventions (Patient has pulse or is breathing): Full Support    DVT prophylaxis:  Medical DVT prophylaxis orders are present.    CODE STATUS:    Code Status (Patient has no pulse and is not breathing): CPR (Attempt to Resuscitate)  Medical Interventions (Patient has pulse or is breathing): Full Support      Disposition:  I expect patient to be discharged observation.    This " patient has been examined wearing appropriate Personal Protective Equipment and discussed with hospital infection control department. 01/09/22      Electronically signed by Jany Graham MD, 01/09/22, 14:50 EST.  [facility] Hospitalist Team

## 2022-01-09 NOTE — PLAN OF CARE
Goal Outcome Evaluation:              Outcome Summary: Ortho surgery and ID consult placed.  Antibiotics given and some complaints of pain during the night.  Will continue to monitor.

## 2022-01-09 NOTE — PLAN OF CARE
Continue to monitor and assess pt.    Problem: Adult Inpatient Plan of Care  Goal: Plan of Care Review  Outcome: Ongoing, Progressing  Flowsheets  Taken 1/9/2022 0236 by Arabella Villafana LPN  Outcome Summary: Ortho surgery and ID consult placed.  Antibiotics given and some complaints of pain during the night.  Will continue to monitor.  Taken 1/8/2022 0259 by Tiffany Wilks RN  Progress: improving  Plan of Care Reviewed With: patient  Goal: Patient-Specific Goal (Individualized)  Outcome: Ongoing, Progressing  Goal: Absence of Hospital-Acquired Illness or Injury  Outcome: Ongoing, Progressing  Intervention: Identify and Manage Fall Risk  Flowsheets (Taken 1/9/2022 0614 by Maricarmen Santos, PCT)  Safety Promotion/Fall Prevention:   safety round/check completed   toileting scheduled   nonskid shoes/slippers when out of bed   lighting adjusted   fall prevention program maintained   clutter free environment maintained   assistive device/personal items within reach  Intervention: Prevent Skin Injury  Flowsheets  Taken 1/9/2022 0712  Body Position: position changed independently  Taken 1/8/2022 0759  Skin Protection:   adhesive use limited   incontinence pads utilized   skin-to-skin areas padded  Intervention: Prevent and Manage VTE (venous thromboembolism) Risk  Flowsheets (Taken 1/8/2022 0759)  VTE Prevention/Management: (MAR) other (see comments)  Intervention: Prevent Infection  Flowsheets (Taken 1/9/2022 0614 by Maricarmen Santos, PCT)  Infection Prevention:   single patient room provided   rest/sleep promoted   personal protective equipment utilized   hand hygiene promoted   equipment surfaces disinfected   environmental surveillance performed  Goal: Optimal Comfort and Wellbeing  Outcome: Ongoing, Progressing  Intervention: Provide Person-Centered Care  Flowsheets (Taken 1/8/2022 1925 by Arabella Villafana LPN)  Trust Relationship/Rapport:   questions answered   reassurance provided  Goal: Readiness for  Transition of Care  Outcome: Ongoing, Progressing  Intervention: Mutually Develop Transition Plan  Flowsheets  Taken 1/7/2022 2311 by Tiffany Wilks, RN  Transportation Anticipated: family or friend will provide  Patient/Family Anticipated Services at Transition: none  Patient/Family Anticipates Transition to: home  Taken 1/7/2022 2307 by Tiffany Wilks, RN  Equipment Currently Used at Home: none     Problem: Skin or Soft Tissue Infection  Goal: Infection Symptom Resolution  Outcome: Ongoing, Progressing  Intervention: Provide Meticulous Infection Site Care  Flowsheets  Taken 1/9/2022 0614 by Maricarmen Santos PCT  Infection Prevention:   single patient room provided   rest/sleep promoted   personal protective equipment utilized   hand hygiene promoted   equipment surfaces disinfected   environmental surveillance performed  Taken 1/8/2022 0715 by Ingrid Pappas RN  Fever Reduction/Comfort Measures: fluid intake increased  Intervention: Minimize and Manage Infection Progression  Flowsheets (Taken 1/9/2022 0614 by Maricarmen Santos PCT)  Isolation Precautions:   contact precautions maintained   droplet precautions maintained     Problem: Glycemic Control Impaired  Goal: Blood Glucose Level Within Desired Range  Outcome: Ongoing, Progressing  Intervention: Optimize Glycemic Control  Flowsheets (Taken 1/8/2022 0715)  Glycemic Management: blood glucose monitoring   Goal Outcome Evaluation:

## 2022-01-09 NOTE — NURSING NOTE
Spoke to pharmacy regarding patient vancomycin level of 4.5 that came back.  They told me to not give the dose that it would be changing.

## 2022-01-09 NOTE — PROGRESS NOTES
"Pharmacy Antimicrobial Dosing Service    Subjective:  Tapan Wilson is a 61 y.o.male admitted with knee pain and swelling. Pharmacy has been consulted to dose Vancomycin for possible bursitis.        Assessment/Plan    1. Day #3 Vancomycin: Goal -600 mcg*h/mL. Vancomycin 2000 mg IV once, followed by 1250 mg IV q12h. Peak 1/8 2112 = 15 mcg/ml and trough 1/9 at 0351= 4.5 mcg/ml. Calculated AUC= 293 mcg*h/ml and trough= 8 mcg/ml. Dose increased to 1750 mg every 12 hrs for anticipated 410 mcg*h/ml and trough= 11.5 mcg/ml. Follow up trough ordered for 1/11 0700.     2. Day #3 Clindamycin: 900 IV q8h    Will continue to monitor drug levels, renal function, culture and sensitivities, and patient clinical status.       Objective:  Relevant clinical data and objective history reviewed:  185.4 cm (73\")   105 kg (232 lb 2.3 oz)   Ideal body weight: 79.9 kg (176 lb 2.4 oz)  Adjusted ideal body weight: 90.1 kg (198 lb 8.7 oz)  Body mass index is 30.63 kg/m².    Results from last 7 days   Lab Units 01/09/22  0351 01/08/22  2112   VANCOMYCIN PK mcg/mL  --  15.00*   VANCOMYCIN TR mcg/mL 4.50*  --      Results from last 7 days   Lab Units 01/08/22  0505 01/07/22  1447 01/05/22  1327   CREATININE mg/dL 0.64* 0.66* 0.72*     Estimated Creatinine Clearance: 152.2 mL/min (A) (by C-G formula based on SCr of 0.64 mg/dL (L)).  I/O last 3 completed shifts:  In: 4080 [P.O.:2280; I.V.:1000; IV Piggyback:800]  Out: 3555 [Urine:3555]    Results from last 7 days   Lab Units 01/08/22  0505 01/07/22  1447   WBC 10*3/mm3 7.60 9.60     Temperature    01/08/22 1958 01/08/22 2341 01/09/22 0417   Temp: 97.9 °F (36.6 °C) 98.2 °F (36.8 °C) 99.3 °F (37.4 °C)     Baseline culture/source/susceptibility:  Microbiology Results (last 10 days)       Procedure Component Value - Date/Time    MRSA Screen, PCR (Inpatient) - Swab, Nares [[id number]]  (Normal) Collected: 01/08/22 0212    Lab Status: Final result Specimen: Swab from Nares Updated: 01/08/22 " 0442     MRSA PCR No MRSA Detected    COVID PRE-OP / PRE-PROCEDURE SCREENING ORDER (NO ISOLATION) - Swab, Nasopharynx [[id number]]  (Abnormal) Collected: 01/07/22 1937    Lab Status: Final result Specimen: Swab from Nasopharynx Updated: 01/07/22 2009    Narrative:      The following orders were created for panel order COVID PRE-OP / PRE-PROCEDURE SCREENING ORDER (NO ISOLATION) - Swab, Nasopharynx.  Procedure                               Abnormality         Status                     ---------                               -----------         ------                     COVID-19,CEPHEID/CHARMAINE,CO...[[id number]]  Abnormal            Final result                 Please view results for these tests on the individual orders.    COVID-19,CEPHEID/CHARMAINE,COR/KATELYN/PAD/BOB IN-HOUSE(OR EMERGENT/ADD-ON),NP SWAB IN TRANSPORT MEDIA 3-4 HR TAT, RT-PCR - Swab, Nasopharynx [[id number]]  (Abnormal) Collected: 01/07/22 1937    Lab Status: Final result Specimen: Swab from Nasopharynx Updated: 01/07/22 2009     COVID19 Detected    Narrative:      Fact sheet for providers: https://www.fda.gov/media/405366/download     Fact sheet for patients: https://www.fda.gov/media/054279/download  Fact sheet for providers: https://www.fda.gov/media/961331/download    Fact sheet for patients: https://www.fda.gov/media/551628/download    Test performed by PCR.    Body Fluid Culture - Body Fluid, Knee, Right [[id number]] Collected: 01/07/22 1526    Lab Status: Preliminary result Specimen: Body Fluid from Knee, Right Updated: 01/08/22 0744     Body Fluid Culture No growth     Gram Stain Many (4+) WBCs per low power field      No organisms seen    Blood Culture - Blood, Arm, Left [[id number]]  (Normal) Collected: 01/07/22 1456    Lab Status: Preliminary result Specimen: Blood from Arm, Left Updated: 01/08/22 1500     Blood Culture No growth at 24 hours    Blood Culture - Blood, Arm, Right [[id number]]  (Normal) Collected: 01/07/22 1447    Lab Status: Preliminary  result Specimen: Blood from Arm, Right Updated: 01/08/22 1500     Blood Culture No growth at 24 hours    Wound Culture - Wound, Knee, Right [[id number]]  (Abnormal)  (Susceptibility) Collected: 01/05/22 1328    Lab Status: Final result Specimen: Wound from Knee, Right Updated: 01/07/22 1251     Wound Culture Heavy growth (4+) Staphylococcus aureus     Gram Stain Few (2+) WBCs per low power field      Few (2+) Gram positive cocci in clusters    Susceptibility      Staphylococcus aureus  REILLY  Clindamycin  Susceptible  Erythromycin  Resistant  Inducible Clindamycin Resistance  Negative  Oxacillin  Susceptible  Rifampin  Susceptible  Tetracycline  Susceptible  Trimethoprim + Sulfamethoxazole  Susceptible  Vancomycin  Susceptible             Susceptibility Comments       Staphylococcus aureus    This isolate does not demonstrate inducible clindamycin resistance in vitro.                         Anti-Infectives (From admission, onward)      Ordered     Dose/Rate Route Frequency Start Stop    01/09/22 0649  vancomycin IVPB 1750 mg in 0.9% Sodium Chloride (premix) 500 mL        Ordering Provider: Jany Graham MD    1,750 mg Intravenous Every 12 Hours 01/09/22 0800 01/1960    01/07/22 1926  clindamycin (CLEOCIN) 900 mg in sodium chloride 0.9% 50 mL IVPB (premix)        Note to Pharmacy: Had dose in ED   Ordering Provider: Snow Abbasi APRN    900 mg  50 mL/hr over 60 Minutes Intravenous Every 8 Hours 01/08/22 0200 01/11/22 0159    01/07/22 2115  Pharmacy to dose vancomycin        Ordering Provider: Kirby King MD     Does not apply Continuous PRN 01/07/22 2115 01/12/22 2114    01/07/22 1531  clindamycin (CLEOCIN) 900 mg in sodium chloride 0.9% 50 mL IVPB (premix)        Ordering Provider: Kirby King MD    900 mg  50 mL/hr over 60 Minutes Intravenous Once 01/07/22 1533 01/07/22 1634    01/07/22 1435  vancomycin 2000 mg/500 mL 0.9% NS IVPB ([facility])        Ordering Provider: Kirby King  MD    20 mg/kg × 104 kg Intravenous Once 01/07/22 1500 01/07/22 1954            Harriett Carbone, PharmD  01/09/22 08:13 EST

## 2022-01-09 NOTE — PROGRESS NOTES
Daily Progress Note    Patient Care Team:  Provider, No Known as PCP - General  You have chosen to receive care through a telehealth visit.  Do you consent to use a video/audio connection for your medical care today? Yes    Chief Complaint: Follow-up type 2 diabetes. Visit conducted through phone due to COVID.    HPI: Blood sugar log reviewed, blood sugars are doing better but is still high.  He is eating well.  No complaints at this time.  Not on a steroid.    ROS:   Constitutional:  Denies fatigue, tiredness.    Respiratory: denies cough, shortness of breath.   Cardiovascular:  denies chest pain, edema   GI:  Denies abdominal pain, nausea, vomiting.       Vitals:    01/09/22 1155   BP: 109/70   Pulse: 68   Resp: 18   Temp: 97.5 °F (36.4 °C)   SpO2: 95%     Body mass index is 30.63 kg/m².    Physical Exam:  GEN: NAD, conversant  PSYCH: Awake and coherent.      Results Review:     I reviewed the patient's new clinical results.    Glucose   Date Value Ref Range Status   01/08/2022 280 (H) 65 - 99 mg/dL Final     Sodium   Date Value Ref Range Status   01/08/2022 137 136 - 145 mmol/L Final     Potassium   Date Value Ref Range Status   01/08/2022 4.3 3.5 - 5.2 mmol/L Final     CO2   Date Value Ref Range Status   01/08/2022 24.0 22.0 - 29.0 mmol/L Final     Chloride   Date Value Ref Range Status   01/08/2022 103 98 - 107 mmol/L Final     Anion Gap   Date Value Ref Range Status   01/08/2022 10.0 5.0 - 15.0 mmol/L Final     Creatinine   Date Value Ref Range Status   01/08/2022 0.64 (L) 0.76 - 1.27 mg/dL Final     BUN   Date Value Ref Range Status   01/08/2022 14 8 - 23 mg/dL Final     BUN/Creatinine Ratio   Date Value Ref Range Status   01/08/2022 21.9 7.0 - 25.0 Final     Calcium   Date Value Ref Range Status   01/08/2022 8.7 8.6 - 10.5 mg/dL Final     eGFR Non  Amer   Date Value Ref Range Status   01/08/2022 127 >60 mL/min/1.73 Final     Alkaline Phosphatase   Date Value Ref Range Status   01/07/2022 81 39  - 117 U/L Final     Total Protein   Date Value Ref Range Status   01/07/2022 7.1 6.0 - 8.5 g/dL Final     ALT (SGPT)   Date Value Ref Range Status   01/07/2022 8 1 - 41 U/L Final     AST (SGOT)   Date Value Ref Range Status   01/07/2022 6 1 - 40 U/L Final     Total Bilirubin   Date Value Ref Range Status   01/07/2022 0.7 0.0 - 1.2 mg/dL Final     Albumin   Date Value Ref Range Status   01/07/2022 3.90 3.50 - 5.20 g/dL Final     Globulin   Date Value Ref Range Status   01/07/2022 3.2 gm/dL Final     No results found for: HGBA1C  No results found for: GLUF, MICROALBUR  Results from last 7 days   Lab Units 01/09/22  1152 01/09/22  0713 01/08/22  2141 01/08/22  1657 01/08/22  1135 01/08/22  0724   GLUCOSE mg/dL 203* 187* 225* 292* 249* 259*             Medication Review: Reviewed.     clindamycin, 900 mg, Intravenous, Q8H  enoxaparin, 40 mg, Subcutaneous, Q24H  insulin glargine, 30 Units, Subcutaneous, QAM  insulin lispro, 0-9 Units, Subcutaneous, TID AC  insulin lispro, 10 Units, Subcutaneous, TID With Meals  senna-docusate sodium, 2 tablet, Oral, BID  sodium chloride, 3 mL, Intravenous, Q12H  vancomycin, 1,750 mg, Intravenous, Q12H          Assessment/Plan   1.  Diabetes mellitus type 2: Uncontrolled with significant hyperglycemia, could be exacerbation due to acute sickness as well.    Will increase Lantus to 35 units subcu daily, increase Humalog to 11 with each meal continue Humalog sliding scale and follow blood sugars and make further adjustments as needed.  2.  Prepatellar bursitis of the right knee/cellulitis right knee: Followed by attending physician.  Currently on antibiotics.  3.  COVID-positive: Per patient he is asymptomatic at this time.             Derrek Chang MD. FACE

## 2022-01-10 ENCOUNTER — APPOINTMENT (OUTPATIENT)
Dept: MRI IMAGING | Facility: HOSPITAL | Age: 62
End: 2022-01-10

## 2022-01-10 LAB
ANION GAP SERPL CALCULATED.3IONS-SCNC: 11 MMOL/L (ref 5–15)
BACTERIA FLD CULT: ABNORMAL
BUN SERPL-MCNC: 14 MG/DL (ref 8–23)
BUN/CREAT SERPL: 21.2 (ref 7–25)
CALCIUM SPEC-SCNC: 8.9 MG/DL (ref 8.6–10.5)
CHLORIDE SERPL-SCNC: 100 MMOL/L (ref 98–107)
CO2 SERPL-SCNC: 24 MMOL/L (ref 22–29)
CREAT SERPL-MCNC: 0.66 MG/DL (ref 0.76–1.27)
GFR SERPL CREATININE-BSD FRML MDRD: 122 ML/MIN/1.73
GLUCOSE BLDC GLUCOMTR-MCNC: 225 MG/DL (ref 70–105)
GLUCOSE BLDC GLUCOMTR-MCNC: 237 MG/DL (ref 70–105)
GLUCOSE BLDC GLUCOMTR-MCNC: 245 MG/DL (ref 70–105)
GLUCOSE BLDC GLUCOMTR-MCNC: 251 MG/DL (ref 70–105)
GLUCOSE BLDC GLUCOMTR-MCNC: 330 MG/DL (ref 70–105)
GLUCOSE SERPL-MCNC: 277 MG/DL (ref 65–99)
GRAM STN SPEC: ABNORMAL
GRAM STN SPEC: ABNORMAL
HBA1C MFR BLD: 11.8 % (ref 3.5–5.6)
POTASSIUM SERPL-SCNC: 4.1 MMOL/L (ref 3.5–5.2)
SODIUM SERPL-SCNC: 135 MMOL/L (ref 136–145)

## 2022-01-10 PROCEDURE — 25010000002 METHYLPREDNISOLONE PER 40 MG: Performed by: ORTHOPAEDIC SURGERY

## 2022-01-10 PROCEDURE — G0378 HOSPITAL OBSERVATION PER HR: HCPCS

## 2022-01-10 PROCEDURE — 80048 BASIC METABOLIC PNL TOTAL CA: CPT | Performed by: HOSPITALIST

## 2022-01-10 PROCEDURE — 99231 SBSQ HOSP IP/OBS SF/LOW 25: CPT | Performed by: INTERNAL MEDICINE

## 2022-01-10 PROCEDURE — 25010000002 CEFTRIAXONE PER 250 MG: Performed by: INTERNAL MEDICINE

## 2022-01-10 PROCEDURE — 96375 TX/PRO/DX INJ NEW DRUG ADDON: CPT

## 2022-01-10 PROCEDURE — 96361 HYDRATE IV INFUSION ADD-ON: CPT

## 2022-01-10 PROCEDURE — 63710000001 INSULIN LISPRO (HUMAN) PER 5 UNITS: Performed by: NURSE PRACTITIONER

## 2022-01-10 PROCEDURE — 63710000001 INSULIN LISPRO (HUMAN) PER 5 UNITS: Performed by: INTERNAL MEDICINE

## 2022-01-10 PROCEDURE — 63710000001 INSULIN GLARGINE PER 5 UNITS: Performed by: INTERNAL MEDICINE

## 2022-01-10 PROCEDURE — 82962 GLUCOSE BLOOD TEST: CPT

## 2022-01-10 PROCEDURE — 96372 THER/PROPH/DIAG INJ SC/IM: CPT

## 2022-01-10 PROCEDURE — 96376 TX/PRO/DX INJ SAME DRUG ADON: CPT

## 2022-01-10 PROCEDURE — 25010000002 ENOXAPARIN PER 10 MG: Performed by: NURSE PRACTITIONER

## 2022-01-10 PROCEDURE — 63710000001 INSULIN REGULAR HUMAN PER 5 UNITS: Performed by: INTERNAL MEDICINE

## 2022-01-10 PROCEDURE — G0108 DIAB MANAGE TRN  PER INDIV: HCPCS

## 2022-01-10 PROCEDURE — 99225 PR SBSQ OBSERVATION CARE/DAY 25 MINUTES: CPT | Performed by: HOSPITALIST

## 2022-01-10 PROCEDURE — 73721 MRI JNT OF LWR EXTRE W/O DYE: CPT

## 2022-01-10 PROCEDURE — 96366 THER/PROPH/DIAG IV INF ADDON: CPT

## 2022-01-10 RX ORDER — METHYLPREDNISOLONE SODIUM SUCCINATE 40 MG/ML
40 INJECTION, POWDER, LYOPHILIZED, FOR SOLUTION INTRAMUSCULAR; INTRAVENOUS EVERY 8 HOURS
Status: COMPLETED | OUTPATIENT
Start: 2022-01-10 | End: 2022-01-11

## 2022-01-10 RX ORDER — INSULIN LISPRO 100 [IU]/ML
INJECTION, SOLUTION INTRAVENOUS; SUBCUTANEOUS
Refills: 2 | Status: CANCELLED | OUTPATIENT
Start: 2022-01-10

## 2022-01-10 RX ORDER — INSULIN LISPRO 100 [IU]/ML
14 INJECTION, SOLUTION INTRAVENOUS; SUBCUTANEOUS
Status: DISCONTINUED | OUTPATIENT
Start: 2022-01-11 | End: 2022-01-12 | Stop reason: HOSPADM

## 2022-01-10 RX ORDER — OXYCODONE HYDROCHLORIDE 5 MG/1
10 TABLET ORAL ONCE
Status: COMPLETED | OUTPATIENT
Start: 2022-01-10 | End: 2022-01-10

## 2022-01-10 RX ADMIN — METHYLPREDNISOLONE SODIUM SUCCINATE 40 MG: 40 INJECTION, POWDER, FOR SOLUTION INTRAMUSCULAR; INTRAVENOUS at 08:20

## 2022-01-10 RX ADMIN — INSULIN LISPRO 4 UNITS: 100 INJECTION, SOLUTION INTRAVENOUS; SUBCUTANEOUS at 08:19

## 2022-01-10 RX ADMIN — SODIUM CHLORIDE, PRESERVATIVE FREE 3 ML: 5 INJECTION INTRAVENOUS at 08:20

## 2022-01-10 RX ADMIN — CLINDAMYCIN PHOSPHATE 900 MG: 900 INJECTION, SOLUTION INTRAVENOUS at 02:14

## 2022-01-10 RX ADMIN — OXYCODONE 10 MG: 5 TABLET ORAL at 10:25

## 2022-01-10 RX ADMIN — INSULIN HUMAN 4 UNITS: 100 INJECTION, SOLUTION PARENTERAL at 11:19

## 2022-01-10 RX ADMIN — CEFTRIAXONE 2 G: 2 INJECTION, POWDER, FOR SOLUTION INTRAMUSCULAR; INTRAVENOUS at 08:29

## 2022-01-10 RX ADMIN — SODIUM CHLORIDE, PRESERVATIVE FREE 3 ML: 5 INJECTION INTRAVENOUS at 21:34

## 2022-01-10 RX ADMIN — ENOXAPARIN SODIUM 40 MG: 40 INJECTION SUBCUTANEOUS at 18:34

## 2022-01-10 RX ADMIN — INSULIN LISPRO 11 UNITS: 100 INJECTION, SOLUTION INTRAVENOUS; SUBCUTANEOUS at 12:33

## 2022-01-10 RX ADMIN — INSULIN GLARGINE 35 UNITS: 100 INJECTION, SOLUTION SUBCUTANEOUS at 06:12

## 2022-01-10 RX ADMIN — DOCUSATE SODIUM AND SENNOSIDES 2 TABLET: 8.6; 5 TABLET, FILM COATED ORAL at 08:20

## 2022-01-10 RX ADMIN — SODIUM CHLORIDE 100 ML/HR: 9 INJECTION, SOLUTION INTRAVENOUS at 21:47

## 2022-01-10 RX ADMIN — SODIUM CHLORIDE 100 ML/HR: 9 INJECTION, SOLUTION INTRAVENOUS at 10:25

## 2022-01-10 RX ADMIN — DOCUSATE SODIUM AND SENNOSIDES 2 TABLET: 8.6; 5 TABLET, FILM COATED ORAL at 21:35

## 2022-01-10 RX ADMIN — INSULIN HUMAN 4 UNITS: 100 INJECTION, SOLUTION PARENTERAL at 18:33

## 2022-01-10 RX ADMIN — OXYCODONE 10 MG: 5 TABLET ORAL at 18:37

## 2022-01-10 RX ADMIN — METHYLPREDNISOLONE SODIUM SUCCINATE 40 MG: 40 INJECTION, POWDER, FOR SOLUTION INTRAMUSCULAR; INTRAVENOUS at 18:33

## 2022-01-10 RX ADMIN — INSULIN LISPRO 7 UNITS: 100 INJECTION, SOLUTION INTRAVENOUS; SUBCUTANEOUS at 18:32

## 2022-01-10 RX ADMIN — OXYCODONE HYDROCHLORIDE 10 MG: 5 TABLET ORAL at 02:50

## 2022-01-10 RX ADMIN — INSULIN LISPRO 11 UNITS: 100 INJECTION, SOLUTION INTRAVENOUS; SUBCUTANEOUS at 18:33

## 2022-01-10 RX ADMIN — INSULIN LISPRO 11 UNITS: 100 INJECTION, SOLUTION INTRAVENOUS; SUBCUTANEOUS at 08:20

## 2022-01-10 RX ADMIN — INSULIN LISPRO 4 UNITS: 100 INJECTION, SOLUTION INTRAVENOUS; SUBCUTANEOUS at 12:32

## 2022-01-10 NOTE — CASE MANAGEMENT/SOCIAL WORK
Discharge Planning Assessment  [facility]     Patient Name: Tapan Wilson  MRN: [id number]  Today's Date: 1/10/2022    Admit Date: 1/7/2022     Discharge Needs Assessment     Row Name 01/10/22 1545       Living Environment    Lives With alone    Current Living Arrangements home/apartment/condo    Primary Care Provided by self    Provides Primary Care For no one    Family Caregiver if Needed none    Able to Return to Prior Arrangements yes       Resource/Environmental Concerns    Resource/Environmental Concerns none    Transportation Concerns car, none       Transition Planning    Patient/Family Anticipates Transition to home with help/services; inpatient rehabilitation facility    Patient/Family Anticipated Services at Transition skilled nursing; home health care    Transportation Anticipated family or friend will provide       Discharge Needs Assessment    Readmission Within the Last 30 Days no previous admission in last 30 days    Equipment Currently Used at Home glucometer; wheelchair    Concerns to be Addressed discharge planning; care coordination/care conferences    Anticipated Changes Related to Illness none    Equipment Needed After Discharge none    Provided Post Acute Provider List? N/A    Provided Post Acute Provider Quality & Resource List? N/A               Discharge Plan     Row Name 01/10/22 7091       Plan    Plan DC plan: from home alone. PT/OT evals pending.    Provided Post Acute Provider List? N/A    Provided Post Acute Provider Quality & Resource List? N/A    Patient/Family in Agreement with Plan yes    Plan Comments Called and spoke to patient over the phone d/t Covid-19+ isolation. Pt states he lives at home alone, drives, and is independent with ADL's normally. PCP is through [facility] in [facility] to confirm provider. Pt uses a glucometer and has a wheelchair. Pt will need 4 wks of IV abx. Limited rehab options d/t Covid+ status. PT/OT ordered to eval.                Demographic Summary     Row Name 01/10/22 1545       General Information    Admission Type observation    Arrived From emergency department    Referral Source admission list    Reason for Consult discharge planning; care coordination/care conference    Preferred Language English     Used During This Interaction no       Contact Information    Permission Granted to Share Info With                Functional Status     Row Name 01/10/22 1545       Functional Status    Usual Activity Tolerance moderate    Current Activity Tolerance moderate       Functional Status, IADL    Medications independent    Meal Preparation independent    Housekeeping independent    Laundry independent    Shopping independent              Phone communication or documentation only - no physical contact with patient or family.    Megan Naegele, RN      Office Phone: [phone]  Office Cell: [phone]

## 2022-01-10 NOTE — CONSULTS
"    Referring Provider: Emergency room  Reason for Consultation: Right knee pain    Patient Care Team:  Provider, No Known as PCP - General    Chief complaint pain in right knee    Subjective .     History of present illness: This patient is a 61-year-old male with spontaneous onset of pain in his right knee x1 week.  He says it is very sore and he can hardly walk because of it.  He says he has never had this problem before.      History  Past Medical History:   Diagnosis Date   • Diabetes mellitus (HCC)    • Disease of thyroid gland    • Hyperlipidemia    • Myocardial infarction (HCC)        Past Surgical History:   Procedure Laterality Date   • ANKLE SURGERY Right    • CARDIAC CATHETERIZATION     • HIP SURGERY Right    • ROTATOR CUFF REPAIR Right           Scheduled Meds:    clindamycin, 900 mg, Intravenous, Q8H  enoxaparin, 40 mg, Subcutaneous, Q24H  insulin glargine, 35 Units, Subcutaneous, QAM  insulin lispro, 0-9 Units, Subcutaneous, TID AC  insulin lispro, 11 Units, Subcutaneous, TID With Meals  methylPREDNISolone sodium succinate, 40 mg, Intravenous, Q8H  senna-docusate sodium, 2 tablet, Oral, BID  sodium chloride, 3 mL, Intravenous, Q12H         PRN Meds:   •  acetaminophen **OR** acetaminophen **OR** acetaminophen  •  senna-docusate sodium **AND** polyethylene glycol **AND** bisacodyl **AND** bisacodyl  •  dextrose  •  dextrose  •  glucagon (human recombinant)  •  melatonin  •  nitroglycerin  •  ondansetron **OR** ondansetron  •  oxyCODONE  •  Pharmacy Consult - Pharmacy to dose  •  sodium chloride      Allergies:    Patient has no known allergies.        Objective     Vital Signs   /82 (BP Location: Right arm, Patient Position: Lying)   Pulse 70   Temp 98.2 °F (36.8 °C) (Oral)   Resp 19   Ht 185.4 cm (73\")   Wt 107 kg (235 lb 0.2 oz)   SpO2 98%   BMI 31.01 kg/m²     Physical Exam:     General Appearance:   Is by himself.  Supine in bed.  Oriented to time place and person.   Extremities:  " Right leg.  He has no effusion but he has prepatellar swelling.  Its tender to palpation.  There is no sign of infection.  There is no drainage.  His active range of motion 0/40 he will not move it any further than that because of his pain.   Pulses:  Deferred   Skin:  Intact   Neurologic:  5/5 plantar flexion dorsiflexion.       Results Review:  CBC    Results from last 7 days   Lab Units 01/09/22  1512 01/08/22  0505 01/07/22  1447   WBC 10*3/mm3 7.20 7.60 9.60   HEMOGLOBIN g/dL 13.8 13.4 14.6   PLATELETS 10*3/mm3 266 248 243     BMP   Results from last 7 days   Lab Units 01/10/22  0322 01/09/22  1512 01/08/22  0505 01/07/22  1447 01/05/22  1327   SODIUM mmol/L 135* 139 137 135* 134*   POTASSIUM mmol/L 4.1 3.9 4.3 4.3 3.9   CHLORIDE mmol/L 100 104 103 98 97*   CO2 mmol/L 24.0 23.0 24.0 25.0 25.0   BUN mg/dL 14 12 14 13 13   CREATININE mg/dL 0.66* 0.68* 0.64* 0.66* 0.72*   GLUCOSE mg/dL 277* 296* 280* 383* 411*     Infection   Results from last 7 days   Lab Units 01/07/22  1526 01/07/22  1456 01/07/22  1447 01/05/22  1328   BLOODCX   --  No growth at 2 days No growth at 2 days  --    BODYFLDCX  Rare Staphylococcus aureus*  --   --   --    WOUNDCX   --   --   --  Heavy growth (4+) Staphylococcus aureus*     Radiology (recent) No radiology results for the last day      I reviewed the patient's new clinical results.  I reviewed the patient's new imaging results and agree with the interpretation.      Assessment/Plan       Prepatellar bursitis of right knee    Cellulitis of right knee    Type 2 diabetes mellitus with hyperglycemia (HCC)      Impression #1 right prepatellar bursitis, spontaneous in onset #2 type 2 diabetes  Recommendations #1 we will aspirate and inject right knee later today.  #2 since he is in the hospital and is blood sugars can be monitored, we will go ahead and give him some steroids for 1 day to try to relieve his symptoms.    I discussed the patient's findings and my recommendations with  patient    Jean Pierre Jon MD  01/10/22  07:08 EST

## 2022-01-10 NOTE — CONSULTS
"Diabetes Education  Assessment/Teaching    Patient Name:  Tapan Wilson  YOB: 1960  MRN: [id number]  Admit Date:  1/7/2022      Assessment Date:  1/10/2022    Most Recent Value   General Information     Referral From: A1c,  Blood glucose,  MD order  [MD consult for blood glucose >200, admission blood sugar 383, and on 1/7/2022 A1c was 11.8%.]   Height 185.4 cm (73\")   Height Method Stated   Weight 107 kg (235 lb 0.2 oz)   Weight Method Bed scale   Pregnancy Assessment    Diabetes History    What type of diabetes do you have? Type 2   Current DM knowledge fair   Do you test your blood sugar at home? yes   Frequency of checks as often as needs   Meter type Freestyle Negin   Who performs the test? patient   Typical readings 200s   Have you had high blood sugar? (>140mg/dl) yes   How often do you have high blood sugar? frequently   When was your last high blood sugar? Admission blood sugar 383   Education Preferences    What areas of diabetes would you like to learn about? avoiding high blood sugar,  diabetes complications,  testing my blood sugar at home,  medications for diabetes   Nutrition Information    Assessment Topics    Taking Medication - Assessment Needs education   Problem Solving - Assessment Needs education   Reducing Risk - Assessment Needs education   Monitoring - Assessment Needs education   DM Goals    Taking Medication - Goal Today   Problem Solving - Goal Today   Reducing Risk - Goal Today   Healthy Coping - Goal Today   Monitoring - Goal Today            Most Recent Value   DM Education Needs    Meter Has own   Meter Type Freestyle  [Patient stated he lost the reader and needs a new reader.]   Medication Insulin  [Patient stated he takes Levemir 26 units every morning at home.]   Problem Solving Hyperglycemia,  Signs,  Symptoms,  Treatment   Reducing Risks A1C testing  [On 1/7/2022 A1c was 11.8%.]   Healthy Coping Depressed  [Patient stated his girlfriend of 18 years just " recently left him.]   Discharge Plan Home   Motivation Moderate   Teaching Method Discussion   Patient Response Verbalized understanding            Other Comments:  Called patient's room due to patient being COVID positive. Discussed A1c result, A1c target, and healthy blood sugar range. Patient stated he has a Freestyle Negin but lost the reader. He stated his blood sugars average in the 200s. Patient stated he was on Metformin but it was d'la and he no longer takes any oral meds for diabetes. Patient stated in the mornings he gets sick if he doesn't eat. Discussed with patient that he is on mealtime insulin in hospital and that if discharged home on the importance of eating when taking rapid acting insulin. Patient spent a lot of time talking about his depression with his girlfriend leaving him. 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 Patient stated playing music makes him feel good. Encouraged patient to continue with his music to help him through his depression. Also discussed with patient that he is on steroids and how steroids can make blood sugars go higher. Instructed patient to notify doctor after discharge if blood sugars >200 for 3 consecutive days. Patient stated he normally gets his blood sugar supplies from [facility]. Called [facility] and confirmed that patient is on the Freestyle Negin with last prescription being filled October 28 for a 1 month supply. Gave order for prescription for a new reader and pods under Dr. Chang's name. Prescriptions started in discharge orders for Levemir, Humalog, pen needles, and alcohol wipes. Patient has no further questions or concerns related to diabetes at this time.        Electronically signed by:  Lo Eli RN  01/10/22 15:17 EST

## 2022-01-10 NOTE — PLAN OF CARE
Goal Outcome Evaluation:  Plan of Care Reviewed With: patient           Outcome Summary: pt c/o pain in knee- see MAR for interventions. pt been resting in bed throughout day. pt had MRI this evening- pend results. no other complaints today. will continue to monitor

## 2022-01-10 NOTE — PROGRESS NOTES
[facility] Medicine Services Daily Progress Note    Patient Name: Tapan Wilson  : 1960  MRN: [id number]  Primary Care Physician:  Ramón, No Known  Date of admission: 2022      Subjective      Chief Complaint: Knee pain    Subjective  Patient redness and swelling has improved but still there, denies for any chest pain, no nausea or vomiting.    Review of Systems   All other systems reviewed and are negative.         Objective      Vitals:   Temp:  [98 °F (36.7 °C)-98.4 °F (36.9 °C)] 98.1 °F (36.7 °C)  Heart Rate:  [67-78] 78  Resp:  [18-19] 19  BP: (117-163)/(63-91) 144/73    Physical Exam  Vitals and nursing note reviewed.   Constitutional:       General: He is not in acute distress.     Appearance: Normal appearance. He is well-developed. He is not ill-appearing, toxic-appearing or diaphoretic.   HENT:      Head: Normocephalic and atraumatic.      Right Ear: Ear canal and external ear normal.      Left Ear: Ear canal and external ear normal.      Nose: Nose normal. No congestion or rhinorrhea.      Mouth/Throat:      Mouth: Mucous membranes are moist.      Pharynx: No oropharyngeal exudate.   Eyes:      General: No scleral icterus.        Right eye: No discharge.         Left eye: No discharge.      Extraocular Movements: Extraocular movements intact.      Conjunctiva/sclera: Conjunctivae normal.      Pupils: Pupils are equal, round, and reactive to light.   Neck:      Thyroid: No thyromegaly.      Vascular: No carotid bruit or JVD.      Trachea: No tracheal deviation.   Cardiovascular:      Rate and Rhythm: Normal rate and regular rhythm.      Pulses: Normal pulses.      Heart sounds: Normal heart sounds. No murmur heard.  No friction rub. No gallop.    Pulmonary:      Effort: Pulmonary effort is normal. No respiratory distress.      Breath sounds: Normal breath sounds. No stridor. No wheezing, rhonchi or rales.   Chest:      Chest wall: No tenderness.   Abdominal:       General: Bowel sounds are normal. There is no distension.      Palpations: Abdomen is soft. There is no mass.      Tenderness: There is no abdominal tenderness. There is no guarding or rebound.      Hernia: No hernia is present.   Musculoskeletal:         General: No swelling, tenderness, deformity or signs of injury. Normal range of motion.      Cervical back: Normal range of motion and neck supple. No rigidity. No muscular tenderness.      Right lower leg: No edema.      Left lower leg: No edema.   Lymphadenopathy:      Cervical: No cervical adenopathy.   Skin:     General: Skin is warm and dry.      Coloration: Skin is not jaundiced or pale.      Findings: No bruising, erythema or rash.   Neurological:      General: No focal deficit present.      Mental Status: He is alert and oriented to person, place, and time. Mental status is at baseline.      Cranial Nerves: No cranial nerve deficit.      Sensory: No sensory deficit.      Motor: No weakness or abnormal muscle tone.      Coordination: Coordination normal.   Psychiatric:         Mood and Affect: Mood normal.         Behavior: Behavior normal.         Thought Content: Thought content normal.         Judgment: Judgment normal.             Result Review    Result Review:  I have personally reviewed the results from the time of this admission to 1/10/2022 17:10 EST and agree with these findings:  [x]  Laboratory  [x]  Microbiology  []  Radiology  []  EKG/Telemetry   []  Cardiology/Vascular   []  Pathology  []  Old records  []  Other:  Most notable findings include:           Assessment/Plan      Brief Patient Summary:  Tapan Wilson is a 61 y.o. male who       cefTRIAXone, 2 g, Intravenous, Q24H  enoxaparin, 40 mg, Subcutaneous, Q24H  insulin glargine, 35 Units, Subcutaneous, QAM  insulin lispro, 0-9 Units, Subcutaneous, TID AC  insulin lispro, 11 Units, Subcutaneous, TID With Meals  insulin regular, 4 Units, Subcutaneous, Q8H  methylPREDNISolone sodium  "succinate, 40 mg, Intravenous, Q8H  senna-docusate sodium, 2 tablet, Oral, BID  sodium chloride, 3 mL, Intravenous, Q12H  IV syringe builder, , Injection, Once       Pharmacy Consult - Steroid Insulin Protocol,   sodium chloride, 100 mL/hr, Last Rate: 100 mL/hr (01/10/22 1233)         Active Hospital Problems:  Active Hospital Problems    Diagnosis    • Prepatellar bursitis of right knee    • Cellulitis of right knee    • Type 2 diabetes mellitus with hyperglycemia (HCC)      Plan:     Prepatellar bursitis of the right knee now with cellulitis culture dated 1/5/2022 grew 4+ staph aureus, off vancomycin and clindamycin, IV Rocephin and started advised in total for 4 weeks, MRI knee to evaluate for any occult abscess requiring intervention. Ortho and ID consulted.     Type 2 diabetes mellitus with hyperglycemia 383 was 411 2 days ago, Endo consulted, will follow the recommendation.     Coronary artery disease patient reports status post PCI at [facility] about 11 years ago no chest pain stable continuous cardiac monitoring, home meds unverified at this time reorder pending verification from pharmacy was on Plavix in past and statin     Hyperlipidemia, home meds unverified at this time reorder pending     Anxiety depression, home meds unverified at this time reorder pending verification pharmacy     Hypothyroidism, home meds unverified at this time reorder pending verification from pharmacy     Obesity BMI 30.34 lifestyle management education     Alcohol use reports \"1 shot not every night\" blood alcohol ordered and pending we will order CIWA if indicated        DVT prophylaxis:  Medical DVT prophylaxis orders are present.     CODE STATUS:    Code Status (Patient has no pulse and is not breathing): CPR (Attempt to Resuscitate)  Medical Interventions (Patient has pulse or is breathing): Full Support    DVT prophylaxis:  Medical DVT prophylaxis orders are present.    CODE STATUS:    Code Status (Patient has no pulse and is " not breathing): CPR (Attempt to Resuscitate)  Medical Interventions (Patient has pulse or is breathing): Full Support      Disposition:  I expect patient to be discharged observation.    This patient has been examined wearing appropriate Personal Protective Equipment and discussed with hospital infection control department. 01/10/22      Electronically signed by Jany Graham MD, 01/10/22, 17:10 EST.  [facility] Hospitalist Team

## 2022-01-10 NOTE — PLAN OF CARE
Goal Outcome Evaluation:              Outcome Summary: Pt continues with pain.  PRN given with little effectiveness. On call Deacon notified. New order for 1 time extra dose of pain medication and ice.  Will continue to monitor.

## 2022-01-10 NOTE — CONSULTS
Infectious Diseases Consult Note    Referring Provider: Jany Graham MD    Reason for Consultation: Antibiotic management    Patient Care Team:  Provider, No Known as PCP - General    Chief complaint right knee pain and swelling    Subjective     The patient has been afebrile for the last 24 hours.  The patient is on room air, hemodynamically stable, and is tolerating antimicrobial therapy.    History of present illness:      This is a 61-year-old male who presents the hospital originally on 522 with right knee swelling and pain.  Patient thought he might have bumped his knee several days before but denies any cuts or injections.  Patient states he had a small wound on his right knee that he thought was an ingrown hair and they took culture of this.  Patient was sent home without any antimicrobial therapy but states his knee continues to be painful and swollen and made it difficult to walk.  He returned to the hospital on 1/7/2022.  At this time the ER physician was able to aspirate 30 cc of purulent drainage from the right knee.  Orthopedic surgery was consulted today.  Patient was also tested positive for Covid.  Patient is unvaccinated but currently has no respiratory symptoms.  He denies fever, chills, diaphoresis, shortness breath, cough, GI symptoms, or any urinary symptoms.    Patient is currently on room air and does not appear to be in acute distress.  Patient's been afebrile since admission.  Today the patient's creatinine 0.66 and yesterday his white blood cell count was 7.2, hemoglobin 13.8, platelets of 266.  Patient's A1c is 11.8, C-reactive protein is 12.51, and sed rate is 41.  Regional cultures from 1/5/2022 is growing distal and susceptible Staph aureus and cultures from the aspiration on 1/7/2022 is also growing MSSA.  Blood cultures are negative.  MRSA of the nares screen is negative and COVID-19 screen is positive.  Knee x-ray shows normal prepatellar and infrapatellar soft tissue  swelling.  Patient received several doses of vancomycin and is currently on IV clindamycin.  He has no known allergies    Patient has a history of type 2 diabetes, hypothyroidism, hyperlipidemia and right hip surgery and right rotator cuff surgery but denies any previous knee surgery.  Patient denies tobacco and illicit drug abuse but does state that he drinks every day.    Review of Systems   Review of Systems   Constitutional: Negative.    HENT: Negative.    Eyes: Negative.    Respiratory: Negative.    Cardiovascular: Negative.    Gastrointestinal: Negative.    Endocrine: Negative.    Genitourinary: Negative.    Musculoskeletal: Positive for joint swelling.   Skin: Negative.    Neurological: Negative.    Psychiatric/Behavioral: Negative.    All other systems reviewed and are negative.      Medications  Medications Prior to Admission   Medication Sig Dispense Refill Last Dose   • amitriptyline (ELAVIL) 10 MG tablet       • atorvastatin (LIPITOR) 20 MG tablet atorvastatin 20 mg tablet      • clopidogrel (Plavix) 75 MG tablet PLAVIX 75 MG TABS      • dapagliflozin-metformin HCl ER (Xigduo XR)  MG tablet Daily.      • glipizide (GLUCOTROL) 10 MG tablet glipizide 10 mg tablet      • HYDROcodone-acetaminophen (NORCO)  MG per tablet HYDROCODONE-ACETAMINOPHEN  MG TABS      • insulin detemir (LEVEMIR) 100 UNIT/ML injection Inject 25 Units under the skin into the appropriate area as directed Daily.      • levothyroxine (SYNTHROID, LEVOTHROID) 50 MCG tablet levothyroxine 50 mcg tablet   TAKE 1 TABLET BY MOUTH EVERY DAY      • Liraglutide (Victoza) 18 MG/3ML solution pen-injector injection VICTOZA 18 MG/3ML SOPN      • metFORMIN (GLUCOPHAGE) 1000 MG tablet Take 1,000 mg by mouth.          History  Past Medical History:   Diagnosis Date   • Diabetes mellitus (HCC)    • Disease of thyroid gland    • Hyperlipidemia    • Myocardial infarction (HCC)      Past Surgical History:   Procedure Laterality Date   •  ANKLE SURGERY Right    • CARDIAC CATHETERIZATION     • HIP SURGERY Right    • ROTATOR CUFF REPAIR Right        Family History  History reviewed. No pertinent family history.    Social History   reports that he has never smoked. He has never used smokeless tobacco. He reports current alcohol use.    Allergies  Patient has no known allergies.    Objective     Vital Signs   Vital Signs (last 24 hours)       01/09 0700  01/10 0659 01/10 0700  01/10 1223   Most Recent      Temp (°F) 97.5 -  98.4      98     98 (36.7) 01/10 0815    Heart Rate 67 -  71      72     72 01/10 0815    Resp 18 -  19      18     18 01/10 0815    /70 -  163/82      155/89     155/89 01/10 0815    SpO2 (%) 95 -  98      96     96 01/10 0815          Physical Exam:  Physical Exam  Vitals and nursing note reviewed.   Constitutional:       General: He is not in acute distress.     Appearance: Normal appearance. He is well-developed and normal weight. He is not diaphoretic.   HENT:      Head: Normocephalic and atraumatic.   Eyes:      Conjunctiva/sclera: Conjunctivae normal.      Pupils: Pupils are equal, round, and reactive to light.   Cardiovascular:      Rate and Rhythm: Normal rate and regular rhythm.      Heart sounds: Normal heart sounds, S1 normal and S2 normal.   Pulmonary:      Effort: Pulmonary effort is normal. No respiratory distress.      Breath sounds: Normal breath sounds. No stridor. No wheezing or rales.   Abdominal:      General: Bowel sounds are normal. There is no distension.      Palpations: Abdomen is soft. There is no mass.      Tenderness: There is no abdominal tenderness. There is no guarding.   Musculoskeletal:         General: No deformity. Normal range of motion.      Cervical back: Neck supple.      Comments: There is appears to be a small wound on the right knee with some mild swelling and fluctuance with no significant erythema.  Knee is very tender to palpation   Skin:     General: Skin is warm and dry.       Coloration: Skin is not pale.      Findings: No erythema or rash.   Neurological:      Mental Status: He is alert and oriented to person, place, and time.      Cranial Nerves: No cranial nerve deficit.   Psychiatric:         Mood and Affect: Mood normal.         Microbiology  Microbiology Results (last 10 days)     Procedure Component Value - Date/Time    MRSA Screen, PCR (Inpatient) - Swab, Nares [[id number]]  (Normal) Collected: 01/08/22 0212    Lab Status: Final result Specimen: Swab from Nares Updated: 01/08/22 0442     MRSA PCR No MRSA Detected    COVID PRE-OP / PRE-PROCEDURE SCREENING ORDER (NO ISOLATION) - Swab, Nasopharynx [[id number]]  (Abnormal) Collected: 01/07/22 1937    Lab Status: Final result Specimen: Swab from Nasopharynx Updated: 01/07/22 2009    Narrative:      The following orders were created for panel order COVID PRE-OP / PRE-PROCEDURE SCREENING ORDER (NO ISOLATION) - Swab, Nasopharynx.  Procedure                               Abnormality         Status                     ---------                               -----------         ------                     COVID-19,CEPHEID/CHARMAINE,CO...[[id number]]  Abnormal            Final result                 Please view results for these tests on the individual orders.    COVID-19,CEPHEID/CHARMAINE,COR/KATELYN/PAD/BOB IN-HOUSE(OR EMERGENT/ADD-ON),NP SWAB IN TRANSPORT MEDIA 3-4 HR TAT, RT-PCR - Swab, Nasopharynx [[id number]]  (Abnormal) Collected: 01/07/22 1937    Lab Status: Final result Specimen: Swab from Nasopharynx Updated: 01/07/22 2009     COVID19 Detected    Narrative:      Fact sheet for providers: https://www.fda.gov/media/608648/download     Fact sheet for patients: https://www.fda.gov/media/012283/download  Fact sheet for providers: https://www.fda.gov/media/274440/download    Fact sheet for patients: https://www.fda.gov/media/105507/download    Test performed by PCR.    Body Fluid Culture - Body Fluid, Knee, Right [[id number]]  (Abnormal)  (Susceptibility)  Collected: 01/07/22 1526    Lab Status: Final result Specimen: Body Fluid from Knee, Right Updated: 01/10/22 0648     Body Fluid Culture Rare Staphylococcus aureus     Gram Stain Many (4+) WBCs per low power field      No organisms seen    Susceptibility      Staphylococcus aureus     REILLY     Gentamicin Susceptible     Oxacillin Susceptible     Rifampin Susceptible     Vancomycin Susceptible                     Susceptibility Comments     Staphylococcus aureus    This isolate does not demonstrate inducible clindamycin resistance in vitro.               Blood Culture - Blood, Arm, Left [[id number]]  (Normal) Collected: 01/07/22 1456    Lab Status: Preliminary result Specimen: Blood from Arm, Left Updated: 01/09/22 1500     Blood Culture No growth at 2 days    Blood Culture - Blood, Arm, Right [[id number]]  (Normal) Collected: 01/07/22 1447    Lab Status: Preliminary result Specimen: Blood from Arm, Right Updated: 01/09/22 1500     Blood Culture No growth at 2 days    Wound Culture - Wound, Knee, Right [[id number]]  (Abnormal)  (Susceptibility) Collected: 01/05/22 1328    Lab Status: Final result Specimen: Wound from Knee, Right Updated: 01/07/22 1251     Wound Culture Heavy growth (4+) Staphylococcus aureus     Gram Stain Few (2+) WBCs per low power field      Few (2+) Gram positive cocci in clusters    Susceptibility      Staphylococcus aureus     REILLY     Clindamycin Susceptible     Erythromycin Resistant     Inducible Clindamycin Resistance Negative     Oxacillin Susceptible     Rifampin Susceptible     Tetracycline Susceptible     Trimethoprim + Sulfamethoxazole Susceptible     Vancomycin Susceptible                     Susceptibility Comments     Staphylococcus aureus    This isolate does not demonstrate inducible clindamycin resistance in vitro.                     Laboratory  Results from last 7 days   Lab Units 01/09/22  1512   WBC 10*3/mm3 7.20   HEMOGLOBIN g/dL 13.8   HEMATOCRIT % 38.5   PLATELETS 10*3/mm3 266      Results from last 7 days   Lab Units 01/10/22  0322   SODIUM mmol/L 135*   POTASSIUM mmol/L 4.1   CHLORIDE mmol/L 100   CO2 mmol/L 24.0   BUN mg/dL 14   CREATININE mg/dL 0.66*   GLUCOSE mg/dL 277*   CALCIUM mg/dL 8.9     Results from last 7 days   Lab Units 01/10/22  0322   SODIUM mmol/L 135*   POTASSIUM mmol/L 4.1   CHLORIDE mmol/L 100   CO2 mmol/L 24.0   BUN mg/dL 14   CREATININE mg/dL 0.66*   GLUCOSE mg/dL 277*   CALCIUM mg/dL 8.9         Results from last 7 days   Lab Units 01/07/22  1447   SED RATE mm/hr 41*           Radiology  Imaging Results (Last 72 Hours)     ** No results found for the last 72 hours. **          Cardiology      Results Review:  I have reviewed all clinical data, test, lab, and imaging results.       Schedule Meds  cefTRIAXone, 2 g, Intravenous, Q24H  enoxaparin, 40 mg, Subcutaneous, Q24H  insulin glargine, 35 Units, Subcutaneous, QAM  insulin lispro, 0-9 Units, Subcutaneous, TID AC  insulin lispro, 11 Units, Subcutaneous, TID With Meals  insulin regular, 4 Units, Subcutaneous, Q8H  methylPREDNISolone sodium succinate, 40 mg, Intravenous, Q8H  senna-docusate sodium, 2 tablet, Oral, BID  sodium chloride, 3 mL, Intravenous, Q12H  IV syringe builder, , Injection, Once        Infusion Meds  Pharmacy Consult - Steroid Insulin Protocol,   sodium chloride, 100 mL/hr, Last Rate: 100 mL/hr (01/10/22 1025)        PRN Meds  •  acetaminophen **OR** acetaminophen **OR** acetaminophen  •  senna-docusate sodium **AND** polyethylene glycol **AND** bisacodyl **AND** bisacodyl  •  dextrose  •  dextrose  •  glucagon (human recombinant)  •  melatonin  •  nitroglycerin  •  ondansetron **OR** ondansetron  •  oxyCODONE  •  Pharmacy Consult - Steroid Insulin Protocol  •  sodium chloride      Assessment/Plan       Assessment    COVID-19 infection in an unvaccinated patient.  Patient tested positive on admission on 1/7/2022.  Patient denies any respiratory symptoms or any symptoms that are unrelated to his  right knee.  -On room air    Right septic prepatellar bursitis.  Patient thinks he bumped his right knee about a week ago and there is a small wound on the knee.  Patient is a seen in the ED on 1/5/2022 and wound culture grew MSSA.  Patient continued of pain and swelling so he returned on 1/7/2022 and 30 cc of purulent material was aspirated in the ED from his knee.  This culture is also growing MSSA.  Fluid analysis shows [id number] nucleated cells which are mainly neutrophils with no crystals seen.  -Patient denies any previous surgeries to the right knee  -Orthopedic surgery is following  -Blood cultures are negative so far    Type 2 diabetes-uncontrolled with A1c of 11.8    Concern for possible alcohol abuse-patient admits to drinking daily    CAD    Plan    Discontinue IV clindamycin  Start IV Rocephin 2 g every 24 hours-patient will likely need 4 weeks of treatment  MRI of the right knee is pending  Continue supportive care  A.m. labs      The patient will need to remain in enhanced airborne isolation for 10 days from the first positive COVID screen on 1/7/2022    Appropriate PPE was used during this assessme      Lesley Brower, JOHANNY  01/10/22  12:23 EST    Note is dictated utilizing voice recognition software/Dragon

## 2022-01-11 LAB
ANION GAP SERPL CALCULATED.3IONS-SCNC: 11 MMOL/L (ref 5–15)
BASOPHILS # BLD AUTO: 0 10*3/MM3 (ref 0–0.2)
BASOPHILS NFR BLD AUTO: 0.2 % (ref 0–1.5)
BUN SERPL-MCNC: 14 MG/DL (ref 8–23)
BUN/CREAT SERPL: 22.6 (ref 7–25)
CALCIUM SPEC-SCNC: 9.4 MG/DL (ref 8.6–10.5)
CHLORIDE SERPL-SCNC: 97 MMOL/L (ref 98–107)
CO2 SERPL-SCNC: 23 MMOL/L (ref 22–29)
CREAT SERPL-MCNC: 0.62 MG/DL (ref 0.76–1.27)
DEPRECATED RDW RBC AUTO: 38.5 FL (ref 37–54)
EOSINOPHIL # BLD AUTO: 0 10*3/MM3 (ref 0–0.4)
EOSINOPHIL NFR BLD AUTO: 0 % (ref 0.3–6.2)
ERYTHROCYTE [DISTWIDTH] IN BLOOD BY AUTOMATED COUNT: 12.7 % (ref 12.3–15.4)
GFR SERPL CREATININE-BSD FRML MDRD: 131 ML/MIN/1.73
GLUCOSE BLDC GLUCOMTR-MCNC: 200 MG/DL (ref 70–105)
GLUCOSE BLDC GLUCOMTR-MCNC: 270 MG/DL (ref 70–105)
GLUCOSE BLDC GLUCOMTR-MCNC: 275 MG/DL (ref 70–105)
GLUCOSE BLDC GLUCOMTR-MCNC: 372 MG/DL (ref 70–105)
GLUCOSE SERPL-MCNC: 365 MG/DL (ref 65–99)
HCT VFR BLD AUTO: 41.7 % (ref 37.5–51)
HGB BLD-MCNC: 14.7 G/DL (ref 13–17.7)
LYMPHOCYTES # BLD AUTO: 0.7 10*3/MM3 (ref 0.7–3.1)
LYMPHOCYTES NFR BLD AUTO: 6.7 % (ref 19.6–45.3)
MCH RBC QN AUTO: 29.4 PG (ref 26.6–33)
MCHC RBC AUTO-ENTMCNC: 35.3 G/DL (ref 31.5–35.7)
MCV RBC AUTO: 83.3 FL (ref 79–97)
MONOCYTES # BLD AUTO: 0.1 10*3/MM3 (ref 0.1–0.9)
MONOCYTES NFR BLD AUTO: 0.7 % (ref 5–12)
NEUTROPHILS NFR BLD AUTO: 9.5 10*3/MM3 (ref 1.7–7)
NEUTROPHILS NFR BLD AUTO: 92.4 % (ref 42.7–76)
NRBC BLD AUTO-RTO: 0.1 /100 WBC (ref 0–0.2)
PLATELET # BLD AUTO: 351 10*3/MM3 (ref 140–450)
PMV BLD AUTO: 8.4 FL (ref 6–12)
POTASSIUM SERPL-SCNC: 4.3 MMOL/L (ref 3.5–5.2)
RBC # BLD AUTO: 5 10*6/MM3 (ref 4.14–5.8)
SODIUM SERPL-SCNC: 131 MMOL/L (ref 136–145)
WBC NRBC COR # BLD: 10.3 10*3/MM3 (ref 3.4–10.8)

## 2022-01-11 PROCEDURE — 85025 COMPLETE CBC W/AUTO DIFF WBC: CPT | Performed by: NURSE PRACTITIONER

## 2022-01-11 PROCEDURE — 25010000002 CEFTRIAXONE PER 250 MG: Performed by: INTERNAL MEDICINE

## 2022-01-11 PROCEDURE — 63710000001 INSULIN LISPRO (HUMAN) PER 5 UNITS: Performed by: NURSE PRACTITIONER

## 2022-01-11 PROCEDURE — 82962 GLUCOSE BLOOD TEST: CPT

## 2022-01-11 PROCEDURE — 80048 BASIC METABOLIC PNL TOTAL CA: CPT | Performed by: NURSE PRACTITIONER

## 2022-01-11 PROCEDURE — 99231 SBSQ HOSP IP/OBS SF/LOW 25: CPT | Performed by: INTERNAL MEDICINE

## 2022-01-11 PROCEDURE — 97162 PT EVAL MOD COMPLEX 30 MIN: CPT

## 2022-01-11 PROCEDURE — 96361 HYDRATE IV INFUSION ADD-ON: CPT

## 2022-01-11 PROCEDURE — 25010000002 ENOXAPARIN PER 10 MG: Performed by: NURSE PRACTITIONER

## 2022-01-11 PROCEDURE — 63710000001 INSULIN GLARGINE PER 5 UNITS: Performed by: INTERNAL MEDICINE

## 2022-01-11 PROCEDURE — 96372 THER/PROPH/DIAG INJ SC/IM: CPT

## 2022-01-11 PROCEDURE — 99225 PR SBSQ OBSERVATION CARE/DAY 25 MINUTES: CPT | Performed by: HOSPITALIST

## 2022-01-11 PROCEDURE — 96376 TX/PRO/DX INJ SAME DRUG ADON: CPT

## 2022-01-11 PROCEDURE — 63710000001 INSULIN LISPRO (HUMAN) PER 5 UNITS: Performed by: INTERNAL MEDICINE

## 2022-01-11 PROCEDURE — 97165 OT EVAL LOW COMPLEX 30 MIN: CPT

## 2022-01-11 PROCEDURE — 63710000001 INSULIN REGULAR HUMAN PER 5 UNITS: Performed by: INTERNAL MEDICINE

## 2022-01-11 PROCEDURE — 25010000002 METHYLPREDNISOLONE PER 40 MG: Performed by: ORTHOPAEDIC SURGERY

## 2022-01-11 RX ORDER — GABAPENTIN 300 MG/1
300 CAPSULE ORAL NIGHTLY
COMMUNITY

## 2022-01-11 RX ORDER — GLIMEPIRIDE 4 MG/1
4 TABLET ORAL
COMMUNITY
End: 2022-07-18

## 2022-01-11 RX ORDER — INSULIN GLARGINE 100 [IU]/ML
25 INJECTION, SOLUTION SUBCUTANEOUS DAILY
COMMUNITY
End: 2022-01-12 | Stop reason: HOSPADM

## 2022-01-11 RX ADMIN — INSULIN HUMAN 4 UNITS: 100 INJECTION, SOLUTION PARENTERAL at 00:09

## 2022-01-11 RX ADMIN — METHYLPREDNISOLONE SODIUM SUCCINATE 40 MG: 40 INJECTION, POWDER, FOR SOLUTION INTRAMUSCULAR; INTRAVENOUS at 00:10

## 2022-01-11 RX ADMIN — SODIUM CHLORIDE 100 ML/HR: 9 INJECTION, SOLUTION INTRAVENOUS at 07:23

## 2022-01-11 RX ADMIN — INSULIN LISPRO 14 UNITS: 100 INJECTION, SOLUTION INTRAVENOUS; SUBCUTANEOUS at 07:23

## 2022-01-11 RX ADMIN — INSULIN GLARGINE 35 UNITS: 100 INJECTION, SOLUTION SUBCUTANEOUS at 07:29

## 2022-01-11 RX ADMIN — ENOXAPARIN SODIUM 40 MG: 40 INJECTION SUBCUTANEOUS at 16:33

## 2022-01-11 RX ADMIN — INSULIN LISPRO 6 UNITS: 100 INJECTION, SOLUTION INTRAVENOUS; SUBCUTANEOUS at 07:24

## 2022-01-11 RX ADMIN — SODIUM CHLORIDE, PRESERVATIVE FREE 3 ML: 5 INJECTION INTRAVENOUS at 20:25

## 2022-01-11 RX ADMIN — INSULIN LISPRO 6 UNITS: 100 INJECTION, SOLUTION INTRAVENOUS; SUBCUTANEOUS at 17:56

## 2022-01-11 RX ADMIN — INSULIN LISPRO 14 UNITS: 100 INJECTION, SOLUTION INTRAVENOUS; SUBCUTANEOUS at 12:14

## 2022-01-11 RX ADMIN — CEFTRIAXONE 2 G: 2 INJECTION, POWDER, FOR SOLUTION INTRAMUSCULAR; INTRAVENOUS at 09:54

## 2022-01-11 RX ADMIN — DOCUSATE SODIUM AND SENNOSIDES 2 TABLET: 8.6; 5 TABLET, FILM COATED ORAL at 09:55

## 2022-01-11 RX ADMIN — INSULIN LISPRO 14 UNITS: 100 INJECTION, SOLUTION INTRAVENOUS; SUBCUTANEOUS at 17:56

## 2022-01-11 RX ADMIN — DOCUSATE SODIUM AND SENNOSIDES 2 TABLET: 8.6; 5 TABLET, FILM COATED ORAL at 20:25

## 2022-01-11 RX ADMIN — OXYCODONE 10 MG: 5 TABLET ORAL at 22:40

## 2022-01-11 RX ADMIN — INSULIN LISPRO 4 UNITS: 100 INJECTION, SOLUTION INTRAVENOUS; SUBCUTANEOUS at 12:14

## 2022-01-11 RX ADMIN — SODIUM CHLORIDE 100 ML/HR: 9 INJECTION, SOLUTION INTRAVENOUS at 17:56

## 2022-01-11 RX ADMIN — SODIUM CHLORIDE, PRESERVATIVE FREE 3 ML: 5 INJECTION INTRAVENOUS at 09:55

## 2022-01-11 NOTE — PROGRESS NOTES
Infectious Diseases Progress Note      LOS: 0 days   Patient Care Team:  Ronnie Haynes PA-C as PCP - General (Physician Assistant)    Chief Complaint: Right knee pain and swelling    Subjective       The patient has been afebrile for the last 24 hours.  The patient is on room air, hemodynamically stable, and is tolerating antimicrobial therapy.      Review of Systems:   Review of Systems   Constitutional: Negative.    HENT: Negative.    Eyes: Negative.    Respiratory: Negative.    Cardiovascular: Negative.    Gastrointestinal: Negative.    Endocrine: Negative.    Genitourinary: Negative.    Musculoskeletal: Positive for joint swelling.   Skin: Negative.    Neurological: Negative.    Psychiatric/Behavioral: Negative.    All other systems reviewed and are negative.       Objective     Vital Signs  Temp:  [97.6 °F (36.4 °C)-98.4 °F (36.9 °C)] 97.6 °F (36.4 °C)  Heart Rate:  [71-82] 76  Resp:  [16-20] 18  BP: (134-160)/(73-91) 134/79    Physical Exam:  Physical Exam  Vitals and nursing note reviewed.   Constitutional:       General: He is not in acute distress.     Appearance: Normal appearance. He is well-developed and normal weight. He is not diaphoretic.   HENT:      Head: Normocephalic and atraumatic.   Eyes:      Conjunctiva/sclera: Conjunctivae normal.      Pupils: Pupils are equal, round, and reactive to light.   Cardiovascular:      Rate and Rhythm: Normal rate and regular rhythm.      Heart sounds: Normal heart sounds, S1 normal and S2 normal.   Pulmonary:      Effort: Pulmonary effort is normal. No respiratory distress.      Breath sounds: Normal breath sounds. No stridor. No wheezing or rales.   Abdominal:      General: Bowel sounds are normal. There is no distension.      Palpations: Abdomen is soft. There is no mass.      Tenderness: There is no abdominal tenderness. There is no guarding.   Musculoskeletal:         General: No deformity. Normal range of motion.      Cervical back: Neck supple.       Comments: Patient continues to have some swelling with mild erythema to the right knee-seems less tender today   Skin:     General: Skin is warm and dry.      Coloration: Skin is not pale.      Findings: No erythema or rash.   Neurological:      Mental Status: He is alert and oriented to person, place, and time.      Cranial Nerves: No cranial nerve deficit.   Psychiatric:         Mood and Affect: Mood normal.          Results Review:    I have reviewed all clinical data, test, lab, and imaging results.     Radiology  MRI Knee Right Without Contrast    Result Date: 1/10/2022  MRI KNEE RIGHT  WO CONTRAST-  Date of Exam: 1/10/2022 5:45 PM  Indication: Vertigo abscess. Redness, swelling, and prepatellar bursitis.  History of diabetes and COVID.  Comparison: Radiographs 01/05/2022.  Technique: Multiplanar multisequence images of the knee were performed according to routine knee MRI protocol.  FINDINGS:  Soft tissues:Small joint effusion. No discrete intra-articular body. Trace fluid extends into a thin popliteal cyst, measuring 3.5 cm craniocaudal, adjacent soft tissue edema indicating partial cyst rupture. Prominent complex prepatellar fluid collection, measuring 5.5 cm x 1.5 cm axial dimensions and extending 6 cm craniocaudal, with surrounding anterior soft tissue edema. No solid soft tissue mass.  Menisci: Longitudinal horizontal oblique tear of the medial meniscus posterior horn extending to the inferior articular margin. No displaced meniscal fragment.  The lateral meniscus is intact.  Cruciate Ligaments: The ACL is intact.  The PCL is intact.  Collateral ligaments: The MCL is intact.  The LCL complex is intact.  Extensor Mechanism: The quadriceps tendon is intact.  The patellar tendon is intact.  Articular cartilage: Diffuse grade II to III chondromalacia in the medial compartment. The articular cartilage in the lateral and patellofemoral compartments is well-maintained.  Bones: Benign 1.2 cm probable  fibro-osseous lesion in the medial femoral metaphysis. No fracture. No concerning bone marrow lesions or marrow replacing process.       1. Large complex prepatellar fluid collection with surrounding soft tissue edema, consistent with prepatellar bursitis. Septic bursitis is not excluded. Correlate with laboratory values and fluid analysis. 2. Longitudinal horizontal oblique tear of the medial meniscus posterior horn extending to the inferior articular margin. No displaced meniscal fragment. 3. Mild to moderate chondromalacia in the medial compartment. 4. Small joint effusion with a partially ruptured popliteal cyst.  Electronically Signed By-Yan Ramirez MD On:1/10/2022 7:56 PM This report was finalized on [id number] by  Yan Ramirez MD.      Cardiology    Laboratory    Results from last 7 days   Lab Units 01/11/22  0230 01/09/22  1512 01/08/22  0505 01/07/22  1447   WBC 10*3/mm3 10.30 7.20 7.60 9.60   HEMOGLOBIN g/dL 14.7 13.8 13.4 14.6   HEMATOCRIT % 41.7 38.5 37.5 41.0   PLATELETS 10*3/mm3 351 266 248 243     Results from last 7 days   Lab Units 01/11/22  0230 01/10/22  0322 01/09/22  1512 01/08/22  0505 01/07/22  1447 01/05/22  1327   SODIUM mmol/L 131* 135* 139 137 135* 134*   POTASSIUM mmol/L 4.3 4.1 3.9 4.3 4.3 3.9   CHLORIDE mmol/L 97* 100 104 103 98 97*   CO2 mmol/L 23.0 24.0 23.0 24.0 25.0 25.0   BUN mg/dL 14 14 12 14 13 13   CREATININE mg/dL 0.62* 0.66* 0.68* 0.64* 0.66* 0.72*   GLUCOSE mg/dL 365* 277* 296* 280* 383* 411*   ALBUMIN g/dL  --   --   --   --  3.90 3.90   BILIRUBIN mg/dL  --   --   --   --  0.7 0.5   ALK PHOS U/L  --   --   --   --  81 97   AST (SGOT) U/L  --   --   --   --  6 9   ALT (SGPT) U/L  --   --   --   --  8 11   CALCIUM mg/dL 9.4 8.9 9.0 8.7 9.5 9.2         Results from last 7 days   Lab Units 01/07/22  1447   SED RATE mm/hr 41*         Microbiology   Microbiology Results (last 10 days)       Procedure Component Value - Date/Time    MRSA Screen, PCR (Inpatient) - Swab, Nares  [[id number]]  (Normal) Collected: 01/08/22 0212    Lab Status: Final result Specimen: Swab from Nares Updated: 01/08/22 0442     MRSA PCR No MRSA Detected    COVID PRE-OP / PRE-PROCEDURE SCREENING ORDER (NO ISOLATION) - Swab, Nasopharynx [[id number]]  (Abnormal) Collected: 01/07/22 1937    Lab Status: Final result Specimen: Swab from Nasopharynx Updated: 01/07/22 2009    Narrative:      The following orders were created for panel order COVID PRE-OP / PRE-PROCEDURE SCREENING ORDER (NO ISOLATION) - Swab, Nasopharynx.  Procedure                               Abnormality         Status                     ---------                               -----------         ------                     COVID-19,CEPHEID/CHARMAINE,CO...[[id number]]  Abnormal            Final result                 Please view results for these tests on the individual orders.    COVID-19,CEPHEID/CHARMAINE,COR/KATELYN/PAD/BOB IN-HOUSE(OR EMERGENT/ADD-ON),NP SWAB IN TRANSPORT MEDIA 3-4 HR TAT, RT-PCR - Swab, Nasopharynx [[id number]]  (Abnormal) Collected: 01/07/22 1937    Lab Status: Final result Specimen: Swab from Nasopharynx Updated: 01/07/22 2009     COVID19 Detected    Narrative:      Fact sheet for providers: https://www.fda.gov/media/579461/download     Fact sheet for patients: https://www.fda.gov/media/299824/download  Fact sheet for providers: https://www.fda.gov/media/021745/download    Fact sheet for patients: https://www.fda.gov/media/670748/download    Test performed by PCR.    Body Fluid Culture - Body Fluid, Knee, Right [[id number]]  (Abnormal)  (Susceptibility) Collected: 01/07/22 1526    Lab Status: Final result Specimen: Body Fluid from Knee, Right Updated: 01/10/22 0648     Body Fluid Culture Rare Staphylococcus aureus     Gram Stain Many (4+) WBCs per low power field      No organisms seen    Susceptibility        Staphylococcus aureus     REILLY     Gentamicin Susceptible     Oxacillin Susceptible     Rifampin Susceptible     Vancomycin Susceptible                      Susceptibility Comments       Staphylococcus aureus    This isolate does not demonstrate inducible clindamycin resistance in vitro.                 Blood Culture - Blood, Arm, Left [[id number]]  (Normal) Collected: 01/07/22 1456    Lab Status: Preliminary result Specimen: Blood from Arm, Left Updated: 01/11/22 1500     Blood Culture No growth at 4 days    Blood Culture - Blood, Arm, Right [[id number]]  (Normal) Collected: 01/07/22 1447    Lab Status: Preliminary result Specimen: Blood from Arm, Right Updated: 01/11/22 1500     Blood Culture No growth at 4 days    Wound Culture - Wound, Knee, Right [[id number]]  (Abnormal)  (Susceptibility) Collected: 01/05/22 1328    Lab Status: Final result Specimen: Wound from Knee, Right Updated: 01/07/22 1251     Wound Culture Heavy growth (4+) Staphylococcus aureus     Gram Stain Few (2+) WBCs per low power field      Few (2+) Gram positive cocci in clusters    Susceptibility        Staphylococcus aureus     REILLY     Clindamycin Susceptible     Erythromycin Resistant     Inducible Clindamycin Resistance Negative     Oxacillin Susceptible     Rifampin Susceptible     Tetracycline Susceptible     Trimethoprim + Sulfamethoxazole Susceptible     Vancomycin Susceptible                     Susceptibility Comments       Staphylococcus aureus    This isolate does not demonstrate inducible clindamycin resistance in vitro.                         Medication Review:       Schedule Meds  cefTRIAXone, 2 g, Intravenous, Q24H  enoxaparin, 40 mg, Subcutaneous, Q24H  insulin glargine, 35 Units, Subcutaneous, QAM  insulin lispro, 0-9 Units, Subcutaneous, TID AC  insulin lispro, 14 Units, Subcutaneous, TID With Meals  senna-docusate sodium, 2 tablet, Oral, BID  sodium chloride, 3 mL, Intravenous, Q12H  IV syringe builder, , Injection, Once        Infusion Meds  Pharmacy Consult - Steroid Insulin Protocol,   sodium chloride, 100 mL/hr, Last Rate: 100 mL/hr (01/11/22 0723)        PRN  Meds    acetaminophen **OR** acetaminophen **OR** acetaminophen    senna-docusate sodium **AND** polyethylene glycol **AND** bisacodyl **AND** bisacodyl    dextrose    dextrose    glucagon (human recombinant)    melatonin    nitroglycerin    ondansetron **OR** ondansetron    oxyCODONE    Pharmacy Consult - Steroid Insulin Protocol    sodium chloride        Assessment/Plan       Antimicrobial Therapy   1.  Rocephin        2.        3.        4.        5.            Assessment     COVID-19 infection in an unvaccinated patient.  Patient tested positive on admission on 1/7/2022.  Patient denies any respiratory symptoms or any symptoms that are unrelated to his right knee.  The patient is currently on room air     Right septic prepatellar bursitis.  Patient thinks he bumped his right knee about a week ago and there is a small wound on the knee.  Patient is a seen in the ED on 1/5/2022 and wound culture grew MSSA.  Patient continued of pain and swelling so he returned on 1/7/2022 and 30 cc of purulent material was aspirated in the ED from his knee.  This culture is also growing MSSA.  Fluid analysis shows 7849 nucleated cells which are mainly neutrophils with no crystals seen.  -Patient denies any previous surgeries to the right knee  -Orthopedic surgery is following  -Blood cultures are negative so far  -MRI showed large prepatellar fluid collection consistent with septic bursitis     Type 2 diabetes-uncontrolled with A1c of 11.8     Concern for possible alcohol abuse-patient admits to drinking daily     CAD     Plan     Continue IV Rocephin 2 g every 24 hours-patient will likely need 4 weeks of treatment  The patient will probably need a washout of the right prepatellar bursa  Continue supportive care  A.m. labs  We will also need to get his blood sugars under control to avoid future infections    The patient will need to remain in enhanced airborne isolation for 10 days from the first positive COVID screen on  1/7/2022    Appropriate PPE was used during this assessment      Lesley Brower, JOHANNY  01/11/22  15:21 EST    Note is dictated utilizing voice recognition software/Dragon

## 2022-01-11 NOTE — THERAPY EVALUATION
Patient Name: Tapan Wilson  : 1960    MRN: [id number]                              Today's Date: 2022       Admit Date: 2022    Visit Dx:     ICD-10-CM ICD-9-CM   1. Prepatellar bursitis of right knee  M70.41 726.65   2. Cellulitis of knee, right  L03.115 682.6   3. Uncontrolled type 2 diabetes mellitus with hyperglycemia (HCC)  E11.65 250.02     Patient Active Problem List   Diagnosis   • Prepatellar bursitis of right knee   • Cellulitis of right knee   • Type 2 diabetes mellitus with hyperglycemia (HCC)     Past Medical History:   Diagnosis Date   • Diabetes mellitus (HCC)    • Disease of thyroid gland    • Hyperlipidemia    • Myocardial infarction (HCC)      Past Surgical History:   Procedure Laterality Date   • ANKLE SURGERY Right    • CARDIAC CATHETERIZATION     • HIP SURGERY Right    • ROTATOR CUFF REPAIR Right       General Information     Row Name 22 1713          Physical Therapy Time and Intention    Document Type evaluation  -     Mode of Treatment physical therapy  -     Row Name 22 1713          General Information    Patient Profile Reviewed yes  -SS     Prior Level of Function independent:  -     Existing Precautions/Restrictions fall  -     Row Name 22 1713          Living Environment    Lives With alone  -     Row Name 22 1713          Home Main Entrance    Number of Stairs, Main Entrance three  -SS     Stair Railings, Main Entrance none  -SS     Row Name 22 1713          Cognition    Orientation Status (Cognition) oriented x 4  -     Row Name 22 1713          Safety Issues, Functional Mobility    Impairments Affecting Function (Mobility) pain  -SS           User Key  (r) = Recorded By, (t) = Taken By, (c) = Cosigned By    Initials Name Provider Type     Hortencia Chisholm PT Physical Therapist               Mobility     Row Name 22 171          Bed Mobility    Bed Mobility bed mobility (all) activities  -     All  Activities, Daniel (Bed Mobility) independent  -     Row Name 01/11/22 1714          Sit-Stand Transfer    Sit-Stand Daniel (Transfers) supervision  -     Row Name 01/11/22 1714          Gait/Stairs (Locomotion)    Daniel Level (Gait) contact guard  -     Assistive Device (Gait) walker, front-wheeled  -     Distance in Feet (Gait) 50'  -     Deviations/Abnormal Patterns (Gait) antalgic  -     Comment (Gait/Stairs) advised pt to utilize RW due to antalgic gait without AD until antalgic gait resolves  -     Row Name 01/11/22 1714          Mobility    Extremity Weight-bearing Status right lower extremity  -     Right Lower Extremity (Weight-bearing Status) weight-bearing as tolerated (WBAT)  -           User Key  (r) = Recorded By, (t) = Taken By, (c) = Cosigned By    Initials Name Provider Type    SS Hortencia Chisholm PT Physical Therapist               Obj/Interventions     Row Name 01/11/22 1715          Range of Motion Comprehensive    Comment, General Range of Motion lacking 10 deg R knee extension otherwise B LE ROM WNL  -[facility] Name 01/11/22 1715          Strength Comprehensive (MMT)    Comment, General Manual Muscle Testing (MMT) Assessment B LE >3/5  -[facility] Name 01/11/22 1715          Motor Skills    Therapeutic Exercise --  quad set x 10 R LE  -[facility] Name 01/11/22 1715          Balance    Static Sitting Balance WNL  -SS     Dynamic Sitting Balance WNL  -SS     Static Standing Balance WNL  -SS     Dynamic Standing Balance mild impairment  -[facility] Name 01/11/22 1715          Sensory Assessment (Somatosensory)    Sensory Assessment (Somatosensory) sensation intact  -           User Key  (r) = Recorded By, (t) = Taken By, (c) = Cosigned By    Initials Name Provider Type    SS Hortencia Chisholm PT Physical Therapist               Goals/Plan     Row Name 01/11/22 1719          Gait Training Goal 1 (PT)    Activity/Assistive Device (Gait Training Goal 1,  PT) gait (walking locomotion); walker, rolling  -SS     [place] Level (Gait Training Goal 1, PT) modified independence  -SS     Distance (Gait Training Goal 1, PT) 75'  -SS     Time Frame (Gait Training Goal 1, PT) long term goal (LTG); 2 weeks  -           User Key  (r) = Recorded By, (t) = Taken By, (c) = Cosigned By    Initials Name Provider Type     Hortencia Chisholm, PT Physical Therapist               Clinical Impression     Row Name 01/11/22 1715          Pain    Additional Documentation Pain Scale: FACES Pre/Post-Treatment (Group)  -     Row Name 01/11/22 1715          Pain Scale: FACES Pre/Post-Treatment    Pain: FACES Scale, Pretreatment 2-->hurts little bit  -SS     Posttreatment Pain Rating 2-->hurts little bit  -SS     Row Name 01/11/22 1715          Plan of Care Review    Plan of Care Reviewed With patient  -SS     Outcome Summary 60 y/o M who presented with R knee pain diagnosed with prepatellar bursitis s/p aspiration. Patient independent and works full time as . Patient lives alone in home with 3 MARYANNE without rail. Does not own RW. Will need RW for d/c. Patient exhibits decreased R knee extension AROM 10 deg with quad set and 30 deg with LAQ. Significant swelling still visible. Patient also (+) for covid but asymptomatic. Pt independent with bed mobility and transfer. CGA for ambulation with RW. Advised to use RW due to antalgic gait without RW. Recommending HHPT due to impaired driving abilities with R knee being involved.  -     Row Name 01/11/22 1715          Therapy Assessment/Plan (PT)    Rehab Potential (PT) good, to achieve stated therapy goals  -     Criteria for Skilled Interventions Met (PT) yes; meets criteria  -     Predicted Duration of Therapy Intervention (PT) untild.c  -     Row Name 01/11/22 1715          Positioning and Restraints    Pre-Treatment Position in bed  -SS     Post Treatment Position bed  -           User Key  (r) = Recorded By, (t) = Taken  By, (c) = Cosigned By    Initials Name Provider Type     Hortencia Chisholm PT Physical Therapist               Outcome Measures    No documentation.                              Physical Therapy Education                 Title: PT OT SLP Therapies (Done)     Topic: Physical Therapy (Done)     Point: Mobility training (Done)     Learning Progress Summary           Patient Acceptance, E, VU by  at 1/11/2022 1720                               User Key     Initials Effective Dates Name Provider Type Discipline     06/16/21 -  Hortencia Chisholm PT Physical Therapist PT              PT Recommendation and Plan  Planned Therapy Interventions (PT): gait training, patient/family education, home exercise program  Plan of Care Reviewed With: patient  Outcome Summary: 62 y/o M who presented with R knee pain diagnosed with prepatellar bursitis s/p aspiration. Patient independent and works full time as . Patient lives alone in home with 3 MARYANNE without rail. Does not own RW. Will need RW for d/c. Patient exhibits decreased R knee extension AROM 10 deg with quad set and 30 deg with LAQ. Significant swelling still visible. Patient also (+) for covid but asymptomatic. Pt independent with bed mobility and transfer. CGA for ambulation with RW. Advised to use RW due to antalgic gait without RW. Recommending HHPT due to impaired driving abilities with R knee being involved.     Time Calculation:    PT Charges     Row Name 01/11/22 1720             Time Calculation    Start Time 1440  -      Stop Time 1510  -      Time Calculation (min) 30 min  -      PT Received On 01/11/22  -      PT - Next Appointment 01/12/22  -      PT Goal Re-Cert Due Date 01/25/22  -              Time Calculation- PT    Total Timed Code Minutes- PT 0 minute(s)  -            User Key  (r) = Recorded By, (t) = Taken By, (c) = Cosigned By    Initials Name Provider Type     Hortencia Chihsolm PT Physical Therapist              Therapy  Charges for Today     Code Description Service Date Service Provider Modifiers Qty    [id number] HC PT EVAL MOD COMPLEXITY 4 1/11/2022 Hortencia Chisholm, PT GP 1          PT G-Codes  AM-PAC 6 Clicks Score (PT): 24    Hortencia Chisholm, PT  1/11/2022

## 2022-01-11 NOTE — THERAPY EVALUATION
Patient Name: Tapan Wilson  : 1960    MRN: [id number]                              Today's Date: 2022       Admit Date: 2022    Visit Dx:     ICD-10-CM ICD-9-CM   1. Prepatellar bursitis of right knee  M70.41 726.65   2. Cellulitis of knee, right  L03.115 682.6   3. Uncontrolled type 2 diabetes mellitus with hyperglycemia (HCC)  E11.65 250.02     Patient Active Problem List   Diagnosis   • Prepatellar bursitis of right knee   • Cellulitis of right knee   • Type 2 diabetes mellitus with hyperglycemia (HCC)     Past Medical History:   Diagnosis Date   • Diabetes mellitus (HCC)    • Disease of thyroid gland    • Hyperlipidemia    • Myocardial infarction (HCC)      Past Surgical History:   Procedure Laterality Date   • ANKLE SURGERY Right    • CARDIAC CATHETERIZATION     • HIP SURGERY Right    • ROTATOR CUFF REPAIR Right       General Information     Row Name 22 1558          OT Time and Intention    Document Type evaluation  -SR     Mode of Treatment individual therapy  -SR     Row Name 22 1558          General Information    Prior Level of Function independent:  -SR     Row Name 22 1558          Occupational Profile    Reason for Services/Referral (Occupational Profile) Pt admitted for R knee pain and dx with repatellar bursitis of right knee. Underwent aspiration on 1/10.  Typically completely indepenent and works as a .  -SR     Row Name 22 1558          Cognition    Orientation Status (Cognition) oriented x 3  -SR           User Key  (r) = Recorded By, (t) = Taken By, (c) = Cosigned By    Initials Name Provider Type    SR Ludmila Buckley OT Occupational Therapist                 Mobility/ADL's     Row Name 22 1600          Transfers    Transfers sit-stand transfer  -SR     Sit-Stand [place] (Transfers) supervision  -SR     Row Name 22 1600          Functional Mobility    Functional Mobility- Ind. Level supervision required  -SR      Functional Mobility- Comment With and without walker.  Pt reported pain with weight bearing without walker.  -SR     Row Name 01/11/22 1600          Activities of Daily Living    BADL Assessment/Intervention lower body dressing  -SR     Row Name 01/11/22 1600          Lower Body Dressing Assessment/Training    [place] Level (Lower Body Dressing) doff; socks; independent  -SR           User Key  (r) = Recorded By, (t) = Taken By, (c) = Cosigned By    Initials Name Provider Type    SR Ludmila Buckley OT Occupational Therapist               Obj/Interventions     Row Name 01/11/22 1602          Balance    Balance Assessment sitting static balance; sitting dynamic balance; standing static balance; standing dynamic balance  -SR     Static Sitting Balance WFL  -SR     Dynamic Sitting Balance WFL  -SR     Static Standing Balance WFL  -SR     Dynamic Standing Balance mild impairment  -SR     Balance Interventions sitting; standing; sit to stand; supported; dynamic; static  -SR           User Key  (r) = Recorded By, (t) = Taken By, (c) = Cosigned By    Initials Name Provider Type    SR Ludmila Buckley, OT Occupational Therapist               Goals/Plan    No documentation.                Clinical Impression     Row Name 01/11/22 1602          Plan of Care Review    Plan of Care Reviewed With patient  -SR     Outcome Summary Pt admitted for R knee pain and dx with repatellar bursitis of right knee. Underwent aspiration on 1/10.  Pt is also COVID (+). Typically completely indepenent and works as a .  Pt continues to have some pain, though tolerates mobility well with rwx.  He is able to complete ADLs without assist.  No further OT needs required, though pt will need to follow up with OP PT.  -SR     Row Name 01/11/22 1602          Therapy Plan Review/Discharge Plan (OT)    Anticipated Discharge Disposition (OT) skilled nursing facility; home with outpatient therapy services  -SR           User Key   (r) = Recorded By, (t) = Taken By, (c) = Cosigned By    Initials Name Provider Type    SR Ludmila Buckley OT Occupational Therapist               Outcome Measures    No documentation.                 Occupational Therapy Education                 Title: PT OT SLP Therapies (Done)     Topic: Occupational Therapy (Done)     Point: ADL training (Done)     Description:   Instruct learner(s) on proper safety adaptation and remediation techniques during self care or transfers.   Instruct in proper use of assistive devices.              Learning Progress Summary           Patient Acceptance, E,TB, VU by  at 1/11/2022 1604                               User Key     Initials Effective Dates Name Provider Type Discipline     06/16/21 -  Ludmila Buckley OT Occupational Therapist OT              OT Recommendation and Plan     Plan of Care Review  Plan of Care Reviewed With: patient  Outcome Summary: Pt admitted for R knee pain and dx with repatellar bursitis of right knee. Underwent aspiration on 1/10.  Pt is also COVID (+). Typically completely indepenent and works as a .  Pt continues to have some pain, though tolerates mobility well with rwx.  He is able to complete ADLs without assist.  No further OT needs required, though pt will need to follow up with OP PT.     Time Calculation:    Time Calculation- OT     Row Name 01/11/22 1604             Time Calculation- OT    OT Start Time 1440  -SR      OT Stop Time 1500  -SR      OT Time Calculation (min) 20 min  -SR      Total Timed Code Minutes- OT 0 minute(s)  -SR      OT Received On 01/11/22  -            User Key  (r) = Recorded By, (t) = Taken By, (c) = Cosigned By    Initials Name Provider Type    SR Ludmila Buckley OT Occupational Therapist              Therapy Charges for Today     Code Description Service Date Service Provider Modifiers Qty    [id number]  OT EVAL LOW COMPLEXITY 3 1/11/2022 Ludmila Buckley OT GO 1                Ludmila Buckley, OT  1/11/2022

## 2022-01-11 NOTE — PROGRESS NOTES
[facility] Medicine Services Daily Progress Note    Patient Name: Tapan Wilson  : 1960  MRN: [id number]  Primary Care Physician:  Ronnie Haynes PA-C  Date of admission: 2022      Subjective      Chief Complaint: Knee pain    Subjective  Patient says his knee swelling is getting better, no nausea or vomiting.    Review of Systems   All other systems reviewed and are negative.         Objective      Vitals:   Temp:  [97.6 °F (36.4 °C)-98.4 °F (36.9 °C)] 97.6 °F (36.4 °C)  Heart Rate:  [71-82] 76  Resp:  [16-20] 18  BP: (134-160)/(73-91) 134/79    Physical Exam  Vitals and nursing note reviewed.   Constitutional:       General: He is not in acute distress.     Appearance: Normal appearance. He is well-developed. He is not ill-appearing, toxic-appearing or diaphoretic.   HENT:      Head: Normocephalic and atraumatic.      Right Ear: Ear canal and external ear normal.      Left Ear: Ear canal and external ear normal.      Nose: Nose normal. No congestion or rhinorrhea.      Mouth/Throat:      Mouth: Mucous membranes are moist.      Pharynx: No oropharyngeal exudate.   Eyes:      General: No scleral icterus.        Right eye: No discharge.         Left eye: No discharge.      Extraocular Movements: Extraocular movements intact.      Conjunctiva/sclera: Conjunctivae normal.      Pupils: Pupils are equal, round, and reactive to light.   Neck:      Thyroid: No thyromegaly.      Vascular: No carotid bruit or JVD.      Trachea: No tracheal deviation.   Cardiovascular:      Rate and Rhythm: Normal rate and regular rhythm.      Pulses: Normal pulses.      Heart sounds: Normal heart sounds. No murmur heard.  No friction rub. No gallop.    Pulmonary:      Effort: Pulmonary effort is normal. No respiratory distress.      Breath sounds: Normal breath sounds. No stridor. No wheezing, rhonchi or rales.   Chest:      Chest wall: No tenderness.   Abdominal:      General: Bowel sounds are  normal. There is no distension.      Palpations: Abdomen is soft. There is no mass.      Tenderness: There is no abdominal tenderness. There is no guarding or rebound.      Hernia: No hernia is present.   Musculoskeletal:         General: No swelling, tenderness, deformity or signs of injury. Normal range of motion.      Cervical back: Normal range of motion and neck supple. No rigidity. No muscular tenderness.      Right lower leg: No edema.      Left lower leg: No edema.   Lymphadenopathy:      Cervical: No cervical adenopathy.   Skin:     General: Skin is warm and dry.      Coloration: Skin is not jaundiced or pale.      Findings: No bruising, erythema or rash.   Neurological:      General: No focal deficit present.      Mental Status: He is alert and oriented to person, place, and time. Mental status is at baseline.      Cranial Nerves: No cranial nerve deficit.      Sensory: No sensory deficit.      Motor: No weakness or abnormal muscle tone.      Coordination: Coordination normal.   Psychiatric:         Mood and Affect: Mood normal.         Behavior: Behavior normal.         Thought Content: Thought content normal.         Judgment: Judgment normal.             Result Review    Result Review:  I have personally reviewed the results from the time of this admission to 1/11/2022 15:24 EST and agree with these findings:  [x]  Laboratory  [x]  Microbiology  []  Radiology  []  EKG/Telemetry   []  Cardiology/Vascular   []  Pathology  []  Old records  []  Other:  Most notable findings include:           Assessment/Plan      Brief Patient Summary:  Tapan Wilson is a 61 y.o. male who       cefTRIAXone, 2 g, Intravenous, Q24H  enoxaparin, 40 mg, Subcutaneous, Q24H  insulin glargine, 35 Units, Subcutaneous, QAM  insulin lispro, 0-9 Units, Subcutaneous, TID AC  insulin lispro, 14 Units, Subcutaneous, TID With Meals  senna-docusate sodium, 2 tablet, Oral, BID  sodium chloride, 3 mL, Intravenous, Q12H  IV syringe  "builder, , Injection, Once       Pharmacy Consult - Steroid Insulin Protocol,   sodium chloride, 100 mL/hr, Last Rate: 100 mL/hr (01/11/22 0894)         Active Hospital Problems:  Active Hospital Problems    Diagnosis    • Prepatellar bursitis of right knee    • Cellulitis of right knee    • Type 2 diabetes mellitus with hyperglycemia (HCC)      Plan:     Prepatellar bursitis of the right knee now with cellulitis culture dated 1/5/2022 grew 4+ staph aureus, off vancomycin and clindamycin, IV Rocephin and started advised in total for 4 weeks, MRI knee revealed complex fluid collection in prepatellar [place] . Ortho and ID consulted.     Type 2 diabetes mellitus with hyperglycemia 383 was 411 2 days ago, Endo consulted, will follow the recommendation.     Coronary artery disease patient reports status post PCI at [facility] about 11 years ago no chest pain stable continuous cardiac monitoring, home meds unverified at this time reorder pending verification from pharmacy was on Plavix in past and statin     Hyperlipidemia, home meds unverified at this time reorder pending     Anxiety depression, home meds unverified at this time reorder pending verification pharmacy     Hypothyroidism, home meds unverified at this time reorder pending verification from pharmacy     Obesity BMI 30.34 lifestyle management education     Alcohol use reports \"1 shot not every night\" blood alcohol ordered and pending we will order CIWA if indicated        DVT prophylaxis:  Medical DVT prophylaxis orders are present.     CODE STATUS:    Code Status (Patient has no pulse and is not breathing): CPR (Attempt to Resuscitate)  Medical Interventions (Patient has pulse or is breathing): Full Support    DVT prophylaxis:  Medical DVT prophylaxis orders are present.    CODE STATUS:    Code Status (Patient has no pulse and is not breathing): CPR (Attempt to Resuscitate)  Medical Interventions (Patient has pulse or is breathing): Full Support      Disposition:  " I expect patient to be discharged observation.    This patient has been examined wearing appropriate Personal Protective Equipment and discussed with hospital infection control department. 01/11/22      Electronically signed by Jany Graham MD, 01/11/22, 15:24 EST.  [facility] Hospitalist Team

## 2022-01-11 NOTE — PROGRESS NOTES
"     LOS: 0 days   Patient Care Team:  Ronnie Haynes PA-C as PCP - General (Physician Assistant)    Chief Complaint: Follow-up right knee    Subjective     Interval History:     History taken from: patient  Patient says that his pain is much better in his right knee.  Symmetric fact he has almost no pain there.  He is mostly complaining about his right hip.  Apparently he was in an accident several years ago and had to have it \"fixed\" with pins in it.  This is the first he has mention it to me.    Objective     Vital Signs  Visit Vitals  /79   Pulse 76   Temp 97.6 °F (36.4 °C)   Resp 18   Ht 185.4 cm (73\")   Wt 104 kg (229 lb 15 oz)   SpO2 94%   BMI 30.34 kg/m²       Physical Exam:        General Appearance:   He is by himself. Supine in bed.  Oriented to time place and person.   Extremities:  Right knee there is still a small prepatellar bursa but it is nontender not red skin is intact.  Active range of motion of his right knee is a painless 0/125.   Pulses:  Intact   Skin:  Intact   Neurologic:  5/5 plantar flexion dorsiflexion         Results Review:    CBC    Results from last 7 days   Lab Units 01/11/22  0230 01/09/22  1512 01/08/22  0505 01/07/22  1447   WBC 10*3/mm3 10.30 7.20 7.60 9.60   HEMOGLOBIN g/dL 14.7 13.8 13.4 14.6   PLATELETS 10*3/mm3 351 266 248 243     BMP   Results from last 7 days   Lab Units 01/11/22  0230 01/10/22  0322 01/09/22  1512 01/08/22  0505 01/07/22  1447 01/05/22  1327   SODIUM mmol/L 131* 135* 139 137 135* 134*   POTASSIUM mmol/L 4.3 4.1 3.9 4.3 4.3 3.9   CHLORIDE mmol/L 97* 100 104 103 98 97*   CO2 mmol/L 23.0 24.0 23.0 24.0 25.0 25.0   BUN mg/dL 14 14 12 14 13 13   CREATININE mg/dL 0.62* 0.66* 0.68* 0.64* 0.66* 0.72*   GLUCOSE mg/dL 365* 277* 296* 280* 383* 411*     Infection   Results from last 7 days   Lab Units 01/07/22  1526 01/07/22  1456 01/07/22  1447 01/05/22  1328   BLOODCX   --  No growth at 4 days No growth at 4 days  --    BODYFLDCX  Rare Staphylococcus " aureus*  --   --   --    WOUNDCX   --   --   --  Heavy growth (4+) Staphylococcus aureus*     Radiology(recent) MRI Knee Right Without Contrast    Result Date: 1/10/2022   1. Large complex prepatellar fluid collection with surrounding soft tissue edema, consistent with prepatellar bursitis. Septic bursitis is not excluded. Correlate with laboratory values and fluid analysis. 2. Longitudinal horizontal oblique tear of the medial meniscus posterior horn extending to the inferior articular margin. No displaced meniscal fragment. 3. Mild to moderate chondromalacia in the medial compartment. 4. Small joint effusion with a partially ruptured popliteal cyst.  Electronically Signed By-Yan Ramirez MD On:1/10/2022 7:56 PM This report was finalized on [id number] by  Yan Ramirez MD.      Imaging reviewed: MRI was reviewed    Medication Review:   Scheduled Meds:cefTRIAXone, 2 g, Intravenous, Q24H  enoxaparin, 40 mg, Subcutaneous, Q24H  insulin glargine, 35 Units, Subcutaneous, QAM  insulin lispro, 0-9 Units, Subcutaneous, TID AC  insulin lispro, 14 Units, Subcutaneous, TID With Meals  senna-docusate sodium, 2 tablet, Oral, BID  sodium chloride, 3 mL, Intravenous, Q12H  IV syringe builder, , Injection, Once      Continuous Infusions:Pharmacy Consult - Steroid Insulin Protocol,   sodium chloride, 100 mL/hr, Last Rate: 100 mL/hr (01/11/22 0723)      PRN Meds:.•  acetaminophen **OR** acetaminophen **OR** acetaminophen  •  senna-docusate sodium **AND** polyethylene glycol **AND** bisacodyl **AND** bisacodyl  •  dextrose  •  dextrose  •  glucagon (human recombinant)  •  melatonin  •  nitroglycerin  •  ondansetron **OR** ondansetron  •  oxyCODONE  •  Pharmacy Consult - Steroid Insulin Protocol  •  sodium chloride    Assessment/Plan       Prepatellar bursitis of right knee    Cellulitis of right knee    Type 2 diabetes mellitus with hyperglycemia (HCC)    Impression #1 right prepatellar bursitis, improving with steroids No. 2 type  2 diabetes #3 status post fixation of right hip  Recommendations #1 since his right knee has improved with steroids not only objectively but subjectly, I think that we are dealing with a bursitis and nothing infectious in nature.  From my point of view he can continue physical therapy and go home anytime.  I will follow him up in the office in 6 weeks.        Jean Pierre Jon MD  01/11/22  17:21 EST

## 2022-01-11 NOTE — CASE MANAGEMENT/SOCIAL WORK
Continued Stay Note   Lei     Patient Name: Tapan Wilson  MRN: [id number]  Today's Date: 1/11/2022    Admit Date: 1/7/2022     Discharge Plan     Row Name 01/11/22 1640       Plan    Plan DC plan: home with home health care. [facility] referral pending.    Plan Comments Called and spoke to patient about 4 weeks of IV abx and OT working with him. He has a sister who may be able to come to the home and assist. Pt thinks he can be taught how to do IV abx. He has limited rehab options d/t Covid+ status. Referral sent to [facility] and liaison notified.              Phone communication or documentation only - no physical contact with patient or family.      Megan Naegele, RN      Office Phone: [phone]  Office Cell: [phone]

## 2022-01-11 NOTE — PROGRESS NOTES
"Subjective   Tapan Wilson is a 61 y.o. male.   Seen for diabetes f/u.  This pt consented to this telemedicine visit in view of the covid 19+ using phone only due to problems with connection.  Eating well.  Started on steroids      Objective     /87 (BP Location: Right arm, Patient Position: Lying)   Pulse 71   Temp 98.1 °F (36.7 °C) (Oral)   Resp 20   Ht 185.4 cm (73\")   Wt 107 kg (235 lb 0.2 oz)   SpO2 92%   BMI 31.01 kg/m²   Blood sugar  237 this am,  330 @ lunch, 245 @ supper    ASSESSMENT    Patient is Improving    PLAN    Started steroid protocol.  Will increase scheduled pre meal lispro         Alejandro Madrigal MD  1/10/2022  21:12 EST    "

## 2022-01-11 NOTE — PLAN OF CARE
Goal Outcome Evaluation:  Plan of Care Reviewed With: patient            60 y/o M who presented with R knee pain diagnosed with prepatellar bursitis s/p aspiration. Patient independent and works full time as . Patient lives alone in home with 3 MARYANNE without rail. Does not own RW. Will need RW for d/c. Patient exhibits decreased R knee extension AROM 10 deg with quad set and 30 deg with LAQ. Significant swelling still visible. Patient also (+) for covid but asymptomatic. Pt independent with bed mobility and transfer. CGA for ambulation with RW. Advised to use RW due to antalgic gait without RW. Recommending HHPT due to impaired driving abilities with R knee being involved.

## 2022-01-11 NOTE — DISCHARGE PLACEMENT REQUEST
"Tapan Wilson (61 y.o. Male)             Date of Birth Social Security Number Address Home Phone MRN    1960  [id number] [address] [phone] [id number]    Worship Marital Status             [employer] Single       Admission Date Admission Type Admitting Provider Attending Provider Department, Room/Bed    1/7/22 Emergency Jany Graham MD Jaisinghani, Salgram, MD [facility] 3C MEDICAL INPATIENT, 365/1    Discharge Date Discharge Disposition Discharge Destination                         Attending Provider: Jany Graham MD    Allergies: No Known Allergies    Isolation: Enh Drop/Con   Infection: COVID (confirmed) (01/07/22)   Code Status: CPR   Advance Care Planning Activity    Ht: 185.4 cm (73\")   Wt: 104 kg (229 lb 15 oz)    Admission Cmt: None   Principal Problem: None                Active Insurance as of 1/7/2022     Primary Coverage     Payor Plan Insurance Group Employer/Plan Group    ANTHEM MEDICARE REPLACEMENT ANTHEM MEDICARE ADVANTAGE [id number]     Payor Plan Address Payor Plan Phone Number Payor Plan Fax Number Effective Dates    [address] [phone]  10/1/2021 - None Entered    [facility] [address]       Subscriber Name Subscriber Birth Date Member ID       TAPAN WILSON 1960 [id number]                 Emergency Contacts      (Rel.) Home Phone Work Phone Mobile Phone    JIM WILSON (Mother) [phone] -- --            "

## 2022-01-11 NOTE — PLAN OF CARE
Goal Outcome Evaluation:  Plan of Care Reviewed With: patient           Outcome Summary: Pt admitted for R knee pain and dx with repatellar bursitis of right knee. Underwent aspiration on 1/10.  Pt is also COVID (+). Typically completely indepenent and works as a .  Pt continues to have some pain, though tolerates mobility well with rwx.  He is able to complete ADLs without assist.  No further OT needs required, though pt will need to follow up with OP PT.

## 2022-01-12 ENCOUNTER — READMISSION MANAGEMENT (OUTPATIENT)
Dept: CALL CENTER | Facility: HOSPITAL | Age: 62
End: 2022-01-12

## 2022-01-12 VITALS
SYSTOLIC BLOOD PRESSURE: 119 MMHG | WEIGHT: 227.07 LBS | OXYGEN SATURATION: 94 % | DIASTOLIC BLOOD PRESSURE: 72 MMHG | TEMPERATURE: 98.1 F | HEIGHT: 73 IN | RESPIRATION RATE: 20 BRPM | HEART RATE: 69 BPM | BODY MASS INDEX: 30.09 KG/M2

## 2022-01-12 LAB
ANION GAP SERPL CALCULATED.3IONS-SCNC: 10 MMOL/L (ref 5–15)
BACTERIA SPEC AEROBE CULT: NORMAL
BACTERIA SPEC AEROBE CULT: NORMAL
BASOPHILS # BLD AUTO: 0.1 10*3/MM3 (ref 0–0.2)
BASOPHILS NFR BLD AUTO: 0.8 % (ref 0–1.5)
BUN SERPL-MCNC: 14 MG/DL (ref 8–23)
BUN/CREAT SERPL: 19.7 (ref 7–25)
CALCIUM SPEC-SCNC: 9 MG/DL (ref 8.6–10.5)
CHLORIDE SERPL-SCNC: 103 MMOL/L (ref 98–107)
CO2 SERPL-SCNC: 25 MMOL/L (ref 22–29)
CREAT SERPL-MCNC: 0.71 MG/DL (ref 0.76–1.27)
DEPRECATED RDW RBC AUTO: 40.3 FL (ref 37–54)
EOSINOPHIL # BLD AUTO: 0 10*3/MM3 (ref 0–0.4)
EOSINOPHIL NFR BLD AUTO: 0.5 % (ref 0.3–6.2)
ERYTHROCYTE [DISTWIDTH] IN BLOOD BY AUTOMATED COUNT: 13.2 % (ref 12.3–15.4)
GFR SERPL CREATININE-BSD FRML MDRD: 112 ML/MIN/1.73
GLUCOSE BLDC GLUCOMTR-MCNC: 117 MG/DL (ref 70–105)
GLUCOSE BLDC GLUCOMTR-MCNC: 211 MG/DL (ref 70–105)
GLUCOSE BLDC GLUCOMTR-MCNC: 249 MG/DL (ref 70–105)
GLUCOSE SERPL-MCNC: 228 MG/DL (ref 65–99)
HCT VFR BLD AUTO: 39.6 % (ref 37.5–51)
HGB BLD-MCNC: 13.9 G/DL (ref 13–17.7)
LYMPHOCYTES # BLD AUTO: 3.2 10*3/MM3 (ref 0.7–3.1)
LYMPHOCYTES NFR BLD AUTO: 34.1 % (ref 19.6–45.3)
MCH RBC QN AUTO: 29.2 PG (ref 26.6–33)
MCHC RBC AUTO-ENTMCNC: 35 G/DL (ref 31.5–35.7)
MCV RBC AUTO: 83.5 FL (ref 79–97)
MONOCYTES # BLD AUTO: 0.4 10*3/MM3 (ref 0.1–0.9)
MONOCYTES NFR BLD AUTO: 4.2 % (ref 5–12)
NEUTROPHILS NFR BLD AUTO: 5.6 10*3/MM3 (ref 1.7–7)
NEUTROPHILS NFR BLD AUTO: 60.4 % (ref 42.7–76)
NRBC BLD AUTO-RTO: 0 /100 WBC (ref 0–0.2)
PLATELET # BLD AUTO: 348 10*3/MM3 (ref 140–450)
PMV BLD AUTO: 8 FL (ref 6–12)
POTASSIUM SERPL-SCNC: 4 MMOL/L (ref 3.5–5.2)
RBC # BLD AUTO: 4.75 10*6/MM3 (ref 4.14–5.8)
SODIUM SERPL-SCNC: 138 MMOL/L (ref 136–145)
WBC NRBC COR # BLD: 9.3 10*3/MM3 (ref 3.4–10.8)

## 2022-01-12 PROCEDURE — G0378 HOSPITAL OBSERVATION PER HR: HCPCS

## 2022-01-12 PROCEDURE — C1751 CATH, INF, PER/CENT/MIDLINE: HCPCS

## 2022-01-12 PROCEDURE — 63710000001 INSULIN GLARGINE PER 5 UNITS: Performed by: INTERNAL MEDICINE

## 2022-01-12 PROCEDURE — 63710000001 INSULIN LISPRO (HUMAN) PER 5 UNITS: Performed by: NURSE PRACTITIONER

## 2022-01-12 PROCEDURE — 99217 PR OBSERVATION CARE DISCHARGE MANAGEMENT: CPT | Performed by: HOSPITALIST

## 2022-01-12 PROCEDURE — 87205 SMEAR GRAM STAIN: CPT | Performed by: ORTHOPAEDIC SURGERY

## 2022-01-12 PROCEDURE — 63710000001 INSULIN LISPRO (HUMAN) PER 5 UNITS: Performed by: INTERNAL MEDICINE

## 2022-01-12 PROCEDURE — 87075 CULTR BACTERIA EXCEPT BLOOD: CPT | Performed by: ORTHOPAEDIC SURGERY

## 2022-01-12 PROCEDURE — 82962 GLUCOSE BLOOD TEST: CPT

## 2022-01-12 PROCEDURE — 80048 BASIC METABOLIC PNL TOTAL CA: CPT | Performed by: NURSE PRACTITIONER

## 2022-01-12 PROCEDURE — 85025 COMPLETE CBC W/AUTO DIFF WBC: CPT | Performed by: NURSE PRACTITIONER

## 2022-01-12 PROCEDURE — 96361 HYDRATE IV INFUSION ADD-ON: CPT

## 2022-01-12 PROCEDURE — 87070 CULTURE OTHR SPECIMN AEROBIC: CPT | Performed by: ORTHOPAEDIC SURGERY

## 2022-01-12 PROCEDURE — 25010000002 CEFTRIAXONE PER 250 MG: Performed by: INTERNAL MEDICINE

## 2022-01-12 RX ORDER — ATORVASTATIN CALCIUM 40 MG/1
80 TABLET, FILM COATED ORAL DAILY
Status: DISCONTINUED | OUTPATIENT
Start: 2022-01-12 | End: 2022-01-12 | Stop reason: HOSPADM

## 2022-01-12 RX ORDER — PEN NEEDLE, DIABETIC 30 GX3/16"
1 NEEDLE, DISPOSABLE MISCELLANEOUS 4 TIMES DAILY
Qty: 200 EACH | Refills: 2 | Status: SHIPPED | OUTPATIENT
Start: 2022-01-12

## 2022-01-12 RX ORDER — CLOPIDOGREL BISULFATE 75 MG/1
75 TABLET ORAL DAILY
Status: DISCONTINUED | OUTPATIENT
Start: 2022-01-12 | End: 2022-01-12 | Stop reason: HOSPADM

## 2022-01-12 RX ORDER — ISOPROPYL ALCOHOL 700 MG/ML
1 CLOTH TOPICAL 4 TIMES DAILY
Qty: 200 EACH | Refills: 2 | Status: SHIPPED | OUTPATIENT
Start: 2022-01-12

## 2022-01-12 RX ORDER — INSULIN DETEMIR 100 [IU]/ML
40 INJECTION, SOLUTION SUBCUTANEOUS DAILY
Qty: 12 ML | Refills: 5 | Status: SHIPPED | OUTPATIENT
Start: 2022-01-12 | End: 2022-07-18

## 2022-01-12 RX ORDER — GABAPENTIN 300 MG/1
300 CAPSULE ORAL NIGHTLY
Status: DISCONTINUED | OUTPATIENT
Start: 2022-01-12 | End: 2022-01-12 | Stop reason: HOSPADM

## 2022-01-12 RX ADMIN — INSULIN LISPRO 4 UNITS: 100 INJECTION, SOLUTION INTRAVENOUS; SUBCUTANEOUS at 17:21

## 2022-01-12 RX ADMIN — INSULIN LISPRO 4 UNITS: 100 INJECTION, SOLUTION INTRAVENOUS; SUBCUTANEOUS at 09:16

## 2022-01-12 RX ADMIN — INSULIN LISPRO 14 UNITS: 100 INJECTION, SOLUTION INTRAVENOUS; SUBCUTANEOUS at 17:21

## 2022-01-12 RX ADMIN — SODIUM CHLORIDE 100 ML/HR: 9 INJECTION, SOLUTION INTRAVENOUS at 03:40

## 2022-01-12 RX ADMIN — ATORVASTATIN CALCIUM 80 MG: 40 TABLET, FILM COATED ORAL at 09:18

## 2022-01-12 RX ADMIN — INSULIN LISPRO 14 UNITS: 100 INJECTION, SOLUTION INTRAVENOUS; SUBCUTANEOUS at 09:16

## 2022-01-12 RX ADMIN — DOCUSATE SODIUM AND SENNOSIDES 2 TABLET: 8.6; 5 TABLET, FILM COATED ORAL at 09:16

## 2022-01-12 RX ADMIN — CLOPIDOGREL BISULFATE 75 MG: 75 TABLET ORAL at 09:18

## 2022-01-12 RX ADMIN — OXYCODONE 10 MG: 5 TABLET ORAL at 09:27

## 2022-01-12 RX ADMIN — INSULIN LISPRO 14 UNITS: 100 INJECTION, SOLUTION INTRAVENOUS; SUBCUTANEOUS at 12:13

## 2022-01-12 RX ADMIN — OXYCODONE 10 MG: 5 TABLET ORAL at 03:31

## 2022-01-12 RX ADMIN — CEFTRIAXONE 2 G: 2 INJECTION, POWDER, FOR SOLUTION INTRAMUSCULAR; INTRAVENOUS at 09:16

## 2022-01-12 RX ADMIN — INSULIN GLARGINE 40 UNITS: 100 INJECTION, SOLUTION SUBCUTANEOUS at 06:18

## 2022-01-12 RX ADMIN — ACETAMINOPHEN 650 MG: 325 TABLET, FILM COATED ORAL at 06:18

## 2022-01-12 RX ADMIN — SODIUM CHLORIDE, PRESERVATIVE FREE 3 ML: 5 INJECTION INTRAVENOUS at 09:17

## 2022-01-12 NOTE — CASE MANAGEMENT/SOCIAL WORK
Continued Stay Note   Lei     Patient Name: Tapan Wilson  MRN: [id number]  Today's Date: 1/12/2022    Admit Date: 1/7/2022     Discharge Plan     Row Name 01/12/22 1647       Plan    Plan DC plan: home with [facility] of [facility] and [facility] for IV abx.    Plan Comments Contacted patient regarding home set up with [facility] supplying IV abx and home health services with [facility]. Discussed assistive equipment and pt would like a quad cane. Order placed with [facility] and delivered to bedside. Confirmed with Lesley POWERS NP for weekly labs of CBC, creatinine, and sed red. End date for IV abx is 2/6/2022.              Expected Discharge Date and Time     Expected Discharge Date Expected Discharge Time    Jan 12, 2022         Phone communication or documentation only - no physical contact with patient or family.      Megan Naegele, RN      Office Phone: [phone]  Office Cell: [phone]

## 2022-01-12 NOTE — PROGRESS NOTES
Infectious Diseases Progress Note      LOS: 1 day   Patient Care Team:  Ronnie Haynes PA-C as PCP - General (Physician Assistant)    Chief Complaint: Right knee pain and swelling    Subjective       The patient has been afebrile for the last 24 hours.  The patient is on room air, hemodynamically stable, and is tolerating antimicrobial therapy.  Patient states that his knee pain and swelling are better      Review of Systems:   Review of Systems   Constitutional: Negative.    HENT: Negative.    Eyes: Negative.    Respiratory: Negative.    Cardiovascular: Negative.    Gastrointestinal: Negative.    Endocrine: Negative.    Genitourinary: Negative.    Musculoskeletal: Positive for joint swelling.   Skin: Negative.    Neurological: Negative.    Psychiatric/Behavioral: Negative.    All other systems reviewed and are negative.       Objective     Vital Signs  Temp:  [98 °F (36.7 °C)-98.8 °F (37.1 °C)] 98.1 °F (36.7 °C)  Heart Rate:  [60-73] 69  Resp:  [18-20] 20  BP: (116-159)/(72-86) 119/72    Physical Exam:  Physical Exam  Vitals and nursing note reviewed.   Constitutional:       General: He is not in acute distress.     Appearance: Normal appearance. He is well-developed and normal weight. He is not diaphoretic.   HENT:      Head: Normocephalic and atraumatic.   Eyes:      Conjunctiva/sclera: Conjunctivae normal.      Pupils: Pupils are equal, round, and reactive to light.   Cardiovascular:      Rate and Rhythm: Normal rate and regular rhythm.      Heart sounds: Normal heart sounds, S1 normal and S2 normal.   Pulmonary:      Effort: Pulmonary effort is normal. No respiratory distress.      Breath sounds: Normal breath sounds. No stridor. No wheezing or rales.   Abdominal:      General: Bowel sounds are normal. There is no distension.      Palpations: Abdomen is soft. There is no mass.      Tenderness: There is no abdominal tenderness. There is no guarding.   Musculoskeletal:         General: No deformity. Normal  range of motion.      Cervical back: Neck supple.      Comments: Patient continues to have some swelling with mild erythema to the right knee-seems less tender today   Skin:     General: Skin is warm and dry.      Coloration: Skin is not pale.      Findings: No erythema or rash.   Neurological:      Mental Status: He is alert and oriented to person, place, and time.      Cranial Nerves: No cranial nerve deficit.   Psychiatric:         Mood and Affect: Mood normal.          Results Review:    I have reviewed all clinical data, test, lab, and imaging results.     Radiology  No Radiology Exams Resulted Within Past 24 Hours    Cardiology    Laboratory    Results from last 7 days   Lab Units 01/12/22  0324 01/11/22  0230 01/09/22  1512 01/08/22  0505 01/07/22  1447   WBC 10*3/mm3 9.30 10.30 7.20 7.60 9.60   HEMOGLOBIN g/dL 13.9 14.7 13.8 13.4 14.6   HEMATOCRIT % 39.6 41.7 38.5 37.5 41.0   PLATELETS 10*3/mm3 348 351 266 248 243     Results from last 7 days   Lab Units 01/12/22  0324 01/11/22  0230 01/10/22  0322 01/09/22  1512 01/08/22  0505 01/07/22  1447   SODIUM mmol/L 138 131* 135* 139 137 135*   POTASSIUM mmol/L 4.0 4.3 4.1 3.9 4.3 4.3   CHLORIDE mmol/L 103 97* 100 104 103 98   CO2 mmol/L 25.0 23.0 24.0 23.0 24.0 25.0   BUN mg/dL 14 14 14 12 14 13   CREATININE mg/dL 0.71* 0.62* 0.66* 0.68* 0.64* 0.66*   GLUCOSE mg/dL 228* 365* 277* 296* 280* 383*   ALBUMIN g/dL  --   --   --   --   --  3.90   BILIRUBIN mg/dL  --   --   --   --   --  0.7   ALK PHOS U/L  --   --   --   --   --  81   AST (SGOT) U/L  --   --   --   --   --  6   ALT (SGPT) U/L  --   --   --   --   --  8   CALCIUM mg/dL 9.0 9.4 8.9 9.0 8.7 9.5         Results from last 7 days   Lab Units 01/07/22  1447   SED RATE mm/hr 41*         Microbiology   Microbiology Results (last 10 days)       Procedure Component Value - Date/Time    Body Fluid Culture - Body Fluid, Knee, Right [[id number]] Collected: 01/12/22 1053    Lab Status: Preliminary result Specimen: Body  Fluid from Knee, Right Updated: 01/12/22 1350     Gram Stain Rare (1+) WBCs per low power field      No organisms seen    MRSA Screen, PCR (Inpatient) - Swab, Nares [[id number]]  (Normal) Collected: 01/08/22 0212    Lab Status: Final result Specimen: Swab from Nares Updated: 01/08/22 0442     MRSA PCR No MRSA Detected    COVID PRE-OP / PRE-PROCEDURE SCREENING ORDER (NO ISOLATION) - Swab, Nasopharynx [[id number]]  (Abnormal) Collected: 01/07/22 1937    Lab Status: Final result Specimen: Swab from Nasopharynx Updated: 01/07/22 2009    Narrative:      The following orders were created for panel order COVID PRE-OP / PRE-PROCEDURE SCREENING ORDER (NO ISOLATION) - Swab, Nasopharynx.  Procedure                               Abnormality         Status                     ---------                               -----------         ------                     COVID-19,CEPHEID/CHARMAINE,CO...[[id number]]  Abnormal            Final result                 Please view results for these tests on the individual orders.    COVID-19,CEPHEID/CHARMAINE,COR/KATELYN/PAD/BOB IN-HOUSE(OR EMERGENT/ADD-ON),NP SWAB IN TRANSPORT MEDIA 3-4 HR TAT, RT-PCR - Swab, Nasopharynx [[id number]]  (Abnormal) Collected: 01/07/22 1937    Lab Status: Final result Specimen: Swab from Nasopharynx Updated: 01/07/22 2009     COVID19 Detected    Narrative:      Fact sheet for providers: https://www.fda.gov/media/281698/download     Fact sheet for patients: https://www.fda.gov/media/422084/download  Fact sheet for providers: https://www.fda.gov/media/673525/download    Fact sheet for patients: https://www.fda.gov/media/581856/download    Test performed by PCR.    Body Fluid Culture - Body Fluid, Knee, Right [[id number]]  (Abnormal)  (Susceptibility) Collected: 01/07/22 1526    Lab Status: Final result Specimen: Body Fluid from Knee, Right Updated: 01/10/22 0648     Body Fluid Culture Rare Staphylococcus aureus     Gram Stain Many (4+) WBCs per low power field      No organisms seen     Susceptibility        Staphylococcus aureus     REILLY     Gentamicin Susceptible     Oxacillin Susceptible     Rifampin Susceptible     Vancomycin Susceptible                     Susceptibility Comments       Staphylococcus aureus    This isolate does not demonstrate inducible clindamycin resistance in vitro.                 Blood Culture - Blood, Arm, Left [[id number]]  (Normal) Collected: 01/07/22 1456    Lab Status: Final result Specimen: Blood from Arm, Left Updated: 01/12/22 1501     Blood Culture No growth at 5 days    Blood Culture - Blood, Arm, Right [[id number]]  (Normal) Collected: 01/07/22 1447    Lab Status: Final result Specimen: Blood from Arm, Right Updated: 01/12/22 1501     Blood Culture No growth at 5 days    Wound Culture - Wound, Knee, Right [[id number]]  (Abnormal)  (Susceptibility) Collected: 01/05/22 1328    Lab Status: Final result Specimen: Wound from Knee, Right Updated: 01/07/22 1251     Wound Culture Heavy growth (4+) Staphylococcus aureus     Gram Stain Few (2+) WBCs per low power field      Few (2+) Gram positive cocci in clusters    Susceptibility        Staphylococcus aureus     REILLY     Clindamycin Susceptible     Erythromycin Resistant     Inducible Clindamycin Resistance Negative     Oxacillin Susceptible     Rifampin Susceptible     Tetracycline Susceptible     Trimethoprim + Sulfamethoxazole Susceptible     Vancomycin Susceptible                     Susceptibility Comments       Staphylococcus aureus    This isolate does not demonstrate inducible clindamycin resistance in vitro.                         Medication Review:       Schedule Meds  atorvastatin, 80 mg, Oral, Daily  cefTRIAXone, 2 g, Intravenous, Q24H  clopidogrel, 75 mg, Oral, Daily  enoxaparin, 40 mg, Subcutaneous, Q24H  gabapentin, 300 mg, Oral, Nightly  insulin glargine, 40 Units, Subcutaneous, QAM  insulin lispro, 0-9 Units, Subcutaneous, TID AC  insulin lispro, 14 Units, Subcutaneous, TID With Meals  senna-docusate  sodium, 2 tablet, Oral, BID  sodium chloride, 3 mL, Intravenous, Q12H  IV syringe builder, , Injection, Once        Infusion Meds  Pharmacy Consult - Steroid Insulin Protocol,   sodium chloride, 100 mL/hr, Last Rate: 100 mL/hr (01/12/22 0340)        PRN Meds    acetaminophen **OR** acetaminophen **OR** acetaminophen    senna-docusate sodium **AND** polyethylene glycol **AND** bisacodyl **AND** bisacodyl    dextrose    dextrose    glucagon (human recombinant)    melatonin    nitroglycerin    ondansetron **OR** ondansetron    oxyCODONE    Pharmacy Consult - Steroid Insulin Protocol    sodium chloride        Assessment/Plan       Antimicrobial Therapy   1.  Rocephin        2.        3.        4.        5.            Assessment     COVID-19 infection in an unvaccinated patient.  Patient tested positive on admission on 1/7/2022.  Patient denies any respiratory symptoms or any symptoms that are unrelated to his right knee.  The patient is currently on room air     Right septic prepatellar bursitis.  Patient thinks he bumped his right knee about a week ago and there is a small wound on the knee.  Patient is a seen in the ED on 1/5/2022 and wound culture grew MSSA.  Patient continued of pain and swelling so he returned on 1/7/2022 and 30 cc of purulent material was aspirated in the ED from his knee.  This culture is also growing MSSA.  Fluid analysis shows 7849 nucleated cells which are mainly neutrophils with no crystals seen.  -Patient denies any previous surgeries to the right knee  -Orthopedic surgery is following  -Blood cultures are negative so far  -MRI showed large prepatellar fluid collection consistent with septic bursitis  -Orthopedic  attempted to aspirate some fluid today and did not feel that a I&D or washout was necessary since patient's symptoms are improving     Type 2 diabetes-uncontrolled with A1c of 11.8     Concern for possible alcohol abuse-patient admits to drinking daily     CAD     Plan     Continue  IV Rocephin 2 g every 24 hours-patient will need 4 weeks of treatment-last day on 2/6/2022  Patient will need a PICC line before discharge-please remove when IV antibiotics are completed.  Weekly labs including CBC, creatinine, sed rate x4 weeks  Continue supportive care  Okay to discharge from Infectious Disease standpoint  Case discussed with RN  We will also need to get his blood sugars under control to avoid future infections     His labs    The patient will need to remain in enhanced airborne isolation for 10 days from the first positive COVID screen on 1/7/2022-recommend that patient isolate at home until January 17, 2022    Appropriate PPE was used during this assessment      Lesley Brower, APRN  01/12/22  16:48 EST    Note is dictated utilizing voice recognition software/Dragon

## 2022-01-12 NOTE — OUTREACH NOTE
Prep Survey      Responses   [facility] discharged from? [facility]   Is LACE score < 7 ? No   Emergency Room discharge w/ pulse ox? No   Eligibility Readm Mgmt   Discharge diagnosis COVID19,    Cellulitis of right knee    Does the patient have one of the following disease processes/diagnoses(primary or secondary)? Other   Does the patient have Home health ordered? Yes   What is the Home health agency?  [facility] and [facility] for [facility]x   Is there a DME ordered? Yes   What DME was ordered?  quad cane - [employer]    Prep survey completed? Yes          Nayeli Lang RN

## 2022-01-12 NOTE — PROGRESS NOTES
"     LOS: 1 day   Patient Care Team:  Ronnie Haynes PA-C as PCP - General (Physician Assistant)    Chief Complaint: Follow-up right knee    Subjective     Interval History:     History taken from: patient RN  History taken from the patient and the nurse taking care of him.    Objective     Vital Signs  Visit Vitals  /75 (BP Location: Right arm, Patient Position: Lying)   Pulse 60   Temp 98.4 °F (36.9 °C) (Oral)   Resp 19   Ht 185.4 cm (73\")   Wt 103 kg (227 lb 1.2 oz)   SpO2 95%   BMI 29.96 kg/m²       Physical Exam:        General Appearance:   Is by himself.  Supine in bed.  Room is dark as usual.  He is in no obvious distress.   Extremities:  Right knee there is no effusion.  He does have a little bogginess in his prepatellar area but it is much less than it was when I first saw him several days ago.  There is no drainage there is no increased warmth there is no erythema.   Pulses:  Deferred   Skin:  Intact   Neurologic:  No deficits         Results Review:    CBC    Results from last 7 days   Lab Units 01/12/22  0324 01/11/22  0230 01/09/22  1512 01/08/22  0505 01/07/22  1447   WBC 10*3/mm3 9.30 10.30 7.20 7.60 9.60   HEMOGLOBIN g/dL 13.9 14.7 13.8 13.4 14.6   PLATELETS 10*3/mm3 348 351 266 248 243     BMP   Results from last 7 days   Lab Units 01/12/22  0324 01/11/22  0230 01/10/22  0322 01/09/22  1512 01/08/22  0505 01/07/22  1447 01/05/22  1327   SODIUM mmol/L 138 131* 135* 139 137 135* 134*   POTASSIUM mmol/L 4.0 4.3 4.1 3.9 4.3 4.3 3.9   CHLORIDE mmol/L 103 97* 100 104 103 98 97*   CO2 mmol/L 25.0 23.0 24.0 23.0 24.0 25.0 25.0   BUN mg/dL 14 14 14 12 14 13 13   CREATININE mg/dL 0.71* 0.62* 0.66* 0.68* 0.64* 0.66* 0.72*   GLUCOSE mg/dL 228* 365* 277* 296* 280* 383* 411*     Infection   Results from last 7 days   Lab Units 01/07/22  1526 01/07/22  1456 01/07/22  1447 01/05/22  1328   BLOODCX   --  No growth at 4 days No growth at 4 days  --    BODYFLDCX  Rare Staphylococcus aureus*  --   --   --  "   WOUNDCX   --   --   --  Heavy growth (4+) Staphylococcus aureus*     Radiology(recent) MRI Knee Right Without Contrast    Result Date: 1/10/2022   1. Large complex prepatellar fluid collection with surrounding soft tissue edema, consistent with prepatellar bursitis. Septic bursitis is not excluded. Correlate with laboratory values and fluid analysis. 2. Longitudinal horizontal oblique tear of the medial meniscus posterior horn extending to the inferior articular margin. No displaced meniscal fragment. 3. Mild to moderate chondromalacia in the medial compartment. 4. Small joint effusion with a partially ruptured popliteal cyst.  Electronically Signed By-Yan Ramirez MD On:1/10/2022 7:56 PM This report was finalized on [id number] by  Yan Ramirez MD.      Imaging reviewed: No new orthopedic images taken    Medication Review:   Scheduled Meds:atorvastatin, 80 mg, Oral, Daily  cefTRIAXone, 2 g, Intravenous, Q24H  clopidogrel, 75 mg, Oral, Daily  enoxaparin, 40 mg, Subcutaneous, Q24H  gabapentin, 300 mg, Oral, Nightly  insulin glargine, 40 Units, Subcutaneous, QAM  insulin lispro, 0-9 Units, Subcutaneous, TID AC  insulin lispro, 14 Units, Subcutaneous, TID With Meals  senna-docusate sodium, 2 tablet, Oral, BID  sodium chloride, 3 mL, Intravenous, Q12H  IV syringe builder, , Injection, Once      Continuous Infusions:Pharmacy Consult - Steroid Insulin Protocol,   sodium chloride, 100 mL/hr, Last Rate: 100 mL/hr (01/12/22 0340)      PRN Meds:.•  acetaminophen **OR** acetaminophen **OR** acetaminophen  •  senna-docusate sodium **AND** polyethylene glycol **AND** bisacodyl **AND** bisacodyl  •  dextrose  •  dextrose  •  glucagon (human recombinant)  •  melatonin  •  nitroglycerin  •  ondansetron **OR** ondansetron  •  oxyCODONE  •  Pharmacy Consult - Steroid Insulin Protocol  •  sodium chloride    Assessment/Plan       Prepatellar bursitis of right knee    Cellulitis of right knee    Type 2 diabetes mellitus with  hyperglycemia (HCC)    Impression #1 right prepatellar bursitis, resolving #2 type 2 diabetes  Recommendations #1 under sterile conditions I attempted an aspiration of his prepatellar bursa.  Only got about 3 cc out and it was blood-tinged.  Is no sign of any purulence and I was unable to get any more.  As stated there is no knee effusion and I did not enter the knee joint.  I think that since his knee is improved so much since admission and there is virtually nothing to washout of his prepatellar area I do not think that he needs an irrigation and debridement at this time.  I will see him in the office in 6 weeks.        Jean Pierre Jon MD  01/12/22  10:39 EST

## 2022-01-12 NOTE — DISCHARGE SUMMARY
[facility] Medicine Services  DISCHARGE SUMMARY    Patient Name: Tapan Wilson  : 1960  MRN: [id number]    Date of Admission: 2022  Date of Discharge:  2022  Primary Care Physician: Ronnie Haynes PA-C      Presenting Problem:   Prepatellar bursitis of right knee [M70.41]  Cellulitis of knee, right [L03.115]  Uncontrolled type 2 diabetes mellitus with hyperglycemia (HCC) [E11.65]    Active and Resolved Hospital Problems:  Active Hospital Problems    Diagnosis POA   • Prepatellar bursitis of right knee [M70.41] Yes   • Cellulitis of right knee [L03.115] Yes   • Type 2 diabetes mellitus with hyperglycemia (HCC) [E11.65] Yes      Resolved Hospital Problems   No resolved problems to display.         Hospital Course     Hospital Course by problem list.    Prepatellar bursitis of the right knee now with cellulitis culture dated 2022 grew 4+ staph aureus, off vancomycin and clindamycin, IV Rocephin and started advised in total for 4 weeks, MRI knee revealed complex fluid collection in prepatellar bursea . Ortho and ID consulted.  DC PICC line once done with a course of IV antibiotics, Ortho advised conservative management and follow-up in 6 weeks, weekly CBC and CMP while on antibiotics.     Type 2 diabetes mellitus with hyperglycemia 383 was 411 2 days ago, Endo consulted, patient will be discharged on insulin regime as described below     Coronary artery disease patient reports status post PCI at [place] about 11 years ago no chest pain stable continuous cardiac monitoring, home meds unverified at this time reorder pending verification from pharmacy was on Plavix in past and statin     Hyperlipidemia, home meds unverified at this time reorder pending     Anxiety depression, home meds unverified at this time reorder pending verification pharmacy     Hypothyroidism, home meds unverified at this time reorder pending verification from pharmacy     Obesity BMI  "30.34 lifestyle management education     Alcohol use reports \"1 shot not every night\" blood alcohol ordered and pending we will order CIWA if indicated        DISCHARGE Follow Up with PCP in a week time.  DC PICC line once done with your course of IV antibiotics  Weekly CBC and CMP while on antibiotic  Follow-up with PCP in a week time  Follow-up with orthopedic service in 6 weeks time    Reasons For Change In Medications and Indications for New Medications:      Day of Discharge     Vital Signs:  Temp:  [98 °F (36.7 °C)-98.8 °F (37.1 °C)] 98.1 °F (36.7 °C)  Heart Rate:  [60-73] 69  Resp:  [18-20] 20  BP: (116-159)/(72-86) 119/72    Physical Exam:  Physical Exam  Vitals and nursing note reviewed.   Constitutional:       General: He is not in acute distress.     Appearance: Normal appearance. He is well-developed. He is not ill-appearing, toxic-appearing or diaphoretic.   HENT:      Head: Normocephalic and atraumatic.      Right Ear: Ear canal and external ear normal.      Left Ear: Ear canal and external ear normal.      Nose: Nose normal. No congestion or rhinorrhea.      Mouth/Throat:      Mouth: Mucous membranes are moist.      Pharynx: No oropharyngeal exudate.   Eyes:      General: No scleral icterus.        Right eye: No discharge.         Left eye: No discharge.      Extraocular Movements: Extraocular movements intact.      Conjunctiva/sclera: Conjunctivae normal.      Pupils: Pupils are equal, round, and reactive to light.   Neck:      Thyroid: No thyromegaly.      Vascular: No carotid bruit or JVD.      Trachea: No tracheal deviation.   Cardiovascular:      Rate and Rhythm: Normal rate and regular rhythm.      Pulses: Normal pulses.      Heart sounds: Normal heart sounds. No murmur heard.  No friction rub. No gallop.    Pulmonary:      Effort: Pulmonary effort is normal. No respiratory distress.      Breath sounds: Normal breath sounds. No stridor. No wheezing, rhonchi or rales.   Chest:      Chest wall: No " tenderness.   Abdominal:      General: Bowel sounds are normal. There is no distension.      Palpations: Abdomen is soft. There is no mass.      Tenderness: There is no abdominal tenderness. There is no guarding or rebound.      Hernia: No hernia is present.   Musculoskeletal:         General: No swelling, tenderness, deformity or signs of injury. Normal range of motion.      Cervical back: Normal range of motion and neck supple. No rigidity. No muscular tenderness.      Right lower leg: No edema.      Left lower leg: No edema.   Lymphadenopathy:      Cervical: No cervical adenopathy.   Skin:     General: Skin is warm and dry.      Coloration: Skin is not jaundiced or pale.      Findings: No bruising, erythema or rash.   Neurological:      General: No focal deficit present.      Mental Status: He is alert and oriented to person, place, and time. Mental status is at baseline.      Cranial Nerves: No cranial nerve deficit.      Sensory: No sensory deficit.      Motor: No weakness or abnormal muscle tone.      Coordination: Coordination normal.   Psychiatric:         Mood and Affect: Mood normal.         Behavior: Behavior normal.         Thought Content: Thought content normal.         Judgment: Judgment normal.            Pertinent  and/or Most Recent Results     LAB RESULTS:      Lab 01/12/22 0324 01/11/22 0230 01/09/22  1512 01/08/22  0505 01/07/22  1447   WBC 9.30 10.30 7.20 7.60 9.60   HEMOGLOBIN 13.9 14.7 13.8 13.4 14.6   HEMATOCRIT 39.6 41.7 38.5 37.5 41.0   PLATELETS 348 351 266 248 243   NEUTROS ABS 5.60 9.50* 4.60 5.00 7.40*   LYMPHS ABS 3.20* 0.70 1.90 2.00 1.50   MONOS ABS 0.40 0.10 0.40 0.40 0.50   EOS ABS 0.00 0.00 0.10 0.10 0.10   MCV 83.5 83.3 85.6 83.4 83.5   SED RATE  --   --   --   --  41*   CRP  --   --   --   --  12.51*   LACTATE  --   --   --  0.8  --          Lab 01/12/22  0324 01/11/22  0230 01/10/22  0322 01/09/22  1512 01/08/22  0505 01/07/22  1447 01/07/22  1447   SODIUM 138 131* 135* 139  137   < > 135*   POTASSIUM 4.0 4.3 4.1 3.9 4.3   < > 4.3   CHLORIDE 103 97* 100 104 103   < > 98   CO2 25.0 23.0 24.0 23.0 24.0   < > 25.0   ANION GAP 10.0 11.0 11.0 12.0 10.0   < > 12.0   BUN 14 14 14 12 14   < > 13   CREATININE 0.71* 0.62* 0.66* 0.68* 0.64*   < > 0.66*   GLUCOSE 228* 365* 277* 296* 280*   < > 383*   CALCIUM 9.0 9.4 8.9 9.0 8.7   < > 9.5   HEMOGLOBIN A1C  --   --   --   --   --   --  11.8*    < > = values in this interval not displayed.         Lab 01/07/22  1447   TOTAL PROTEIN 7.1   ALBUMIN 3.90   GLOBULIN 3.2   ALT (SGPT) 8   AST (SGOT) 6   BILIRUBIN 0.7   ALK PHOS 81                     Brief Urine Lab Results     None        Microbiology Results (last 10 days)     Procedure Component Value - Date/Time    Body Fluid Culture - Body Fluid, Knee, Right [[id number]] Collected: 01/12/22 1053    Lab Status: Preliminary result Specimen: Body Fluid from Knee, Right Updated: 01/12/22 1350     Gram Stain Rare (1+) WBCs per low power field      No organisms seen    MRSA Screen, PCR (Inpatient) - Swab, Nares [[id number]]  (Normal) Collected: 01/08/22 0212    Lab Status: Final result Specimen: Swab from Nares Updated: 01/08/22 0442     MRSA PCR No MRSA Detected    COVID PRE-OP / PRE-PROCEDURE SCREENING ORDER (NO ISOLATION) - Swab, Nasopharynx [[id number]]  (Abnormal) Collected: 01/07/22 1937    Lab Status: Final result Specimen: Swab from Nasopharynx Updated: 01/07/22 2009    Narrative:      The following orders were created for panel order COVID PRE-OP / PRE-PROCEDURE SCREENING ORDER (NO ISOLATION) - Swab, Nasopharynx.  Procedure                               Abnormality         Status                     ---------                               -----------         ------                     COVID-19,CEPHEID/CHARMAINE,CO...[[id number]]  Abnormal            Final result                 Please view results for these tests on the individual orders.    COVID-19,CEPHEID/CHARMAINE,COR/KATELYN/PAD/BOB IN-HOUSE(OR  EMERGENT/ADD-ON),NP SWAB IN TRANSPORT MEDIA 3-4 HR TAT, RT-PCR - Swab, Nasopharynx [[id number]]  (Abnormal) Collected: 01/07/22 1937    Lab Status: Final result Specimen: Swab from Nasopharynx Updated: 01/07/22 2009     COVID19 Detected    Narrative:      Fact sheet for providers: https://www.fda.gov/media/312625/download     Fact sheet for patients: https://www.fda.gov/media/091235/download  Fact sheet for providers: https://www.fda.gov/media/182184/download    Fact sheet for patients: https://www.fda.gov/media/460189/download    Test performed by PCR.    Body Fluid Culture - Body Fluid, Knee, Right [[id number]]  (Abnormal)  (Susceptibility) Collected: 01/07/22 1526    Lab Status: Final result Specimen: Body Fluid from Knee, Right Updated: 01/10/22 0648     Body Fluid Culture Rare Staphylococcus aureus     Gram Stain Many (4+) WBCs per low power field      No organisms seen    Susceptibility      Staphylococcus aureus     REILLY     Gentamicin Susceptible     Oxacillin Susceptible     Rifampin Susceptible     Vancomycin Susceptible                     Susceptibility Comments     Staphylococcus aureus    This isolate does not demonstrate inducible clindamycin resistance in vitro.               Blood Culture - Blood, Arm, Left [[id number]]  (Normal) Collected: 01/07/22 1456    Lab Status: Final result Specimen: Blood from Arm, Left Updated: 01/12/22 1501     Blood Culture No growth at 5 days    Blood Culture - Blood, Arm, Right [[id number]]  (Normal) Collected: 01/07/22 1447    Lab Status: Final result Specimen: Blood from Arm, Right Updated: 01/12/22 1501     Blood Culture No growth at 5 days    Wound Culture - Wound, Knee, Right [[id number]]  (Abnormal)  (Susceptibility) Collected: 01/05/22 1328    Lab Status: Final result Specimen: Wound from Knee, Right Updated: 01/07/22 1251     Wound Culture Heavy growth (4+) Staphylococcus aureus     Gram Stain Few (2+) WBCs per low power field      Few (2+) Gram positive cocci in  clusters    Susceptibility      Staphylococcus aureus     REILLY     Clindamycin Susceptible     Erythromycin Resistant     Inducible Clindamycin Resistance Negative     Oxacillin Susceptible     Rifampin Susceptible     Tetracycline Susceptible     Trimethoprim + Sulfamethoxazole Susceptible     Vancomycin Susceptible                     Susceptibility Comments     Staphylococcus aureus    This isolate does not demonstrate inducible clindamycin resistance in vitro.                     XR Knee 3 View Right    Result Date: 1/5/2022  Impression: Abnormal prepatellar and infrapatellar superficial soft tissue swelling suggestive of prepatellar bursitis. No underlying osseous abnormality.  Electronically Signed By-Gricel Cunningham MD On:1/5/2022 12:43 PM This report was finalized on [id number] by  Gricel Cunningham MD.    MRI Knee Right Without Contrast    Result Date: 1/10/2022  Impression:  1. Large complex prepatellar fluid collection with surrounding soft tissue edema, consistent with prepatellar bursitis. Septic bursitis is not excluded. Correlate with laboratory values and fluid analysis. 2. Longitudinal horizontal oblique tear of the medial meniscus posterior horn extending to the inferior articular margin. No displaced meniscal fragment. 3. Mild to moderate chondromalacia in the medial compartment. 4. Small joint effusion with a partially ruptured popliteal cyst.  Electronically Signed By-Yan Ramirez MD On:1/10/2022 7:56 PM This report was finalized on [id number] by  Yan Ramirez MD.                  Labs Pending at Discharge:  Pending Labs     Order Current Status    Anaerobic Culture - Swab, Knee, Right In process    Body Fluid Culture - Body Fluid, Knee, Right Preliminary result          Procedures Performed           Consults:   Consults     Date and Time Order Name Status Description    1/9/2022 10:34 AM Inpatient Infectious Diseases Consult Completed     1/8/2022  4:22 PM Inpatient Orthopedic Surgery  Consult Completed     1/8/2022 12:36 AM Inpatient Endocrinology Consult              Discharge Details        Discharge Medications      New Medications      Instructions Start Date   Alcohol Wipes 70 % misc   1 each, Apply externally, 4 Times Daily      cefTRIAXone 2 g in sodium chloride 0.9 % 100 mL IVPB   2 g, Intravenous, Every 24 Hours   Start Date: January 13, 2022     Levemir 100 UNIT/ML injection  Generic drug: insulin detemir   40 Units, Subcutaneous, Daily      Pen Needles 32G X 4 MM misc   1 each, Does not apply, 4 Times Daily         Continue These Medications      Instructions Start Date   atorvastatin 80 MG tablet  Commonly known as: LIPITOR   80 mg, Oral, Daily      gabapentin 300 MG capsule  Commonly known as: NEURONTIN   300 mg, Oral, Nightly      glimepiride 4 MG tablet  Commonly known as: AMARYL   4 mg, Oral, Every Morning Before Breakfast      HYDROcodone-acetaminophen  MG per tablet  Commonly known as: NORCO   1 tablet, Oral, Every 6 Hours PRN      metFORMIN 1000 MG tablet  Commonly known as: GLUCOPHAGE   1,000 mg, Oral, 2 Times Daily With Meals      Plavix 75 MG tablet  Generic drug: clopidogrel   75 mg, Oral, Daily         Stop These Medications    insulin glargine 100 UNIT/ML injection  Commonly known as: LANTUS, SEMGLEE            No Known Allergies      Discharge Disposition:   Home-Health Care Sv    Diet:  Hospital:  Diet Order   Procedures   • Diet Cardiac, Diabetic/Consistent Carbs; Healthy Heart; Diabetic - Consistent Carb         Discharge Activity:         CODE STATUS:  Code Status and Medical Interventions:   Ordered at: 01/07/22 1932     Code Status (Patient has no pulse and is not breathing):    CPR (Attempt to Resuscitate)     Medical Interventions (Patient has pulse or is breathing):    Full Support         No future appointments.    Additional Instructions for the Follow-ups that You Need to Schedule     Ambulatory Referral to Home Health   As directed      IV Ceftriaxone  2g every 24 hours--last dose will be on 2/6/22. Will need weekly labs of CBC, Creatinine, Sed Rate; weekly dressing changes. Remove PICC once IV abx completed.    Order Comments: IV Ceftriaxone 2g every 24 hours--last dose will be on 2/6/22. Will need weekly labs of CBC, Creatinine, Sed Rate; weekly dressing changes. Remove PICC once IV abx completed.     Face to Face Visit Date: 1/12/2022    Follow-up provider for Plan of Care?: I treated the patient in an acute care facility and will not continue treatment after discharge.    Follow-up provider: MILLI ELIZABETH [[id number]]    Reason/Clinical Findings: post-hospitalization eval, IV abx    Describe mobility limitations that make leaving home difficult: tires easily, mobility limited    Nursing/Therapeutic Services Requested: Skilled Nursing Physical Therapy    Skilled nursing orders: PICC line care/instruction Infusion therapy    PT orders: Other (add comment)    Frequency: 1 Week 1               Time spent on Discharge including face to face service 33 minutes    This patient has been examined wearing appropriate Personal Protective Equipment and discussed with hospital infection control department. 01/12/22      Signature:

## 2022-01-12 NOTE — CONSULTS
picc team consult:  4fr single lumen power picc placed to rue brachial vein per md order. Pt to receive iv rocephin x 4 weeks. picc tip located lower svc/caj per sapiens device. picc okay for use. Pt tolerated procedure well without complication. rn aware

## 2022-01-12 NOTE — DISCHARGE INSTRUCTIONS
Follow up with PCP in a week time.  D/C picc line once done with course of iv antibiotics  Follow up with PCP in a week time.  Follow up with Orthpaedic service Dr. Jon in four week time  Weekly cbc and CMP while on antibiotics

## 2022-01-12 NOTE — CASE MANAGEMENT/SOCIAL WORK
Continued Stay Note   [facility]     Patient Name: Tapan Wilson  MRN: [id number]  Today's Date: 1/12/2022    Admit Date: 1/7/2022     Discharge Plan     Row Name 01/12/22 1119       Plan    Plan DC plan: home with [facility] for IV abx.    Plan Comments [facility] declined pt d/t staffing. Referral sent to [facility] and [facility] in Epic baskets and liaisons notified. Pt has been accepted but awaiting set up. Dr Jon aspirated knee this AM--will need to await those cultures.              Phone communication or documentation only - no physical contact with patient or family.    Megan Naegele, RN      Office Phone: [phone]  Office Cell: [phone]

## 2022-01-12 NOTE — PROGRESS NOTES
"Subjective   Tapan Wilson is a 61 y.o. male.   Seen for diabetes f/u. This pt consented to this telemedicine visit in view of the covid 19 + using phone only due to problems with connection.  Feeling beter.  Last steroid dose @ 00:10 today.      Objective     /77   Pulse 73   Temp 98.4 °F (36.9 °C)   Resp 18   Ht 185.4 cm (73\")   Wt 104 kg (229 lb 15 oz)   SpO2 93%   BMI 30.34 kg/m²   Blood sugar  275 this am,  200 @ lunch, 270 @ supper    ASSESSMENT    Patient is stable    PLAN    Will increase Lantus dose for tomorrow.         Alejandro Madrigal MD  1/11/2022  20:08 EST    "

## 2022-01-12 NOTE — PLAN OF CARE
Goal Outcome Evaluation:              Outcome Summary: Pt has c/o pain in his knee this shift- see MAR for interventions. Will continue to observe.

## 2022-01-13 NOTE — CASE MANAGEMENT/SOCIAL WORK
Case Management Discharge Note         Selected Continued Care - Discharged on 1/12/2022 Admission date: 1/7/2022 - Discharge disposition: Home-Health Care Svc        Durable Medical Equipment Coordination complete.    Service Provider Selected Services Address Phone Fax Patient Preferred    [facility]  [facility]urable Medical [facility] [address] [address] [phone] --          Dialysis/Infusion Coordination complete.    Service Provider Selected Services Address Phone Fax Patient Preferred    [facility]  Infusion and IV Therapy [address] [facility] [address] [address] [phone] --          Home Medical Care Coordination complete.    Service Provider Selected Services Address Phone Fax Patient Preferred    [facility]  Home Health Services [address] [place] [address] [phone] --           Final Discharge Disposition Code: 06 - home with home health care

## 2022-01-17 ENCOUNTER — READMISSION MANAGEMENT (OUTPATIENT)
Dept: CALL CENTER | Facility: HOSPITAL | Age: 62
End: 2022-01-17

## 2022-01-17 ENCOUNTER — HOME HEALTH ADMISSION (OUTPATIENT)
Dept: HOME HEALTH SERVICES | Facility: HOME HEALTHCARE | Age: 62
End: 2022-01-17

## 2022-01-17 LAB
BACTERIA FLD CULT: NORMAL
BACTERIA SPEC ANAEROBE CULT: NORMAL
GRAM STN SPEC: NORMAL
GRAM STN SPEC: NORMAL

## 2022-01-17 NOTE — OUTREACH NOTE
Medical Week 1 Survey      Responses   [facility] [place] patient discharged from? [facility]   Does the patient have one of the following disease processes/diagnoses(primary or secondary)? Other   Week 1 attempt successful? Yes   Call start time 0850   Call end time 0852   Discharge diagnosis COVID19,    Cellulitis of right knee    Meds reviewed with patient/caregiver? Yes   Is the patient having any side effects they believe may be caused by any medication additions or changes? No   Does the patient have all medications ordered at discharge? Yes   Is the patient taking all medications as directed (includes completed medication regime)? Yes   Does the patient have a primary care provider?  Yes   Does the patient have an appointment with their PCP within 7 days of discharge? No   Comments regarding PCP PATIENT HAS NOT YET MADE HIS FOLLOW UP APPOINTMENTS WITH HIS PCP OR DR. PICKARD (ORTHO). HE STATES HE WILL CALL TODAY AND GET THEM SCHEDULED   What is preventing the patient from scheduling follow up appointments within 7 days of discharge? Haven't had time   Nursing Interventions Educated patient on importance of making appointment,  Advised patient to make appointment   Has the patient kept scheduled appointments due by today? N/A   What is the Home health agency?  [facility] for [facility]   Has home health visited the patient within 72 hours of discharge? Yes   What DME was ordered?  [employer]    Has all DME been delivered? Yes   Psychosocial issues? No   Did the patient receive a copy of their discharge instructions? Yes   Nursing interventions Reviewed instructions with patient   What is the patient's perception of their health status since discharge? Improving   Is the patient/caregiver able to teach back signs and symptoms related to disease process for when to call PCP? Yes   Is the patient/caregiver able to teach back signs and symptoms related to disease process for when to call 911? Yes   Is  the patient/caregiver able to teach back the hierarchy of who to call/visit for symptoms/problems? PCP, Specialist, Home health nurse, Urgent Care, ED, 911 Yes   If the patient is a current smoker, are they able to teach back resources for cessation? Not a smoker   Week 1 call completed? Yes          Roshni Chisholm LPN

## 2022-01-25 ENCOUNTER — READMISSION MANAGEMENT (OUTPATIENT)
Dept: CALL CENTER | Facility: HOSPITAL | Age: 62
End: 2022-01-25

## 2022-01-25 NOTE — OUTREACH NOTE
Medical Week 2 Survey      Responses   [facility] patient discharged from? [facility]   Does the patient have one of the following disease processes/diagnoses(primary or secondary)? Other   Week 2 attempt successful? No   Unsuccessful attempts Attempt 1          Roshni Chisholm LPN

## 2022-01-26 ENCOUNTER — READMISSION MANAGEMENT (OUTPATIENT)
Dept: CALL CENTER | Facility: HOSPITAL | Age: 62
End: 2022-01-26

## 2022-01-27 NOTE — OUTREACH NOTE
Medical Week 2 Survey      Responses   [facility] patient discharged from? [facility]   Does the patient have one of the following disease processes/diagnoses(primary or secondary)? Other   Week 2 attempt successful? Yes   Call start time 1913   Discharge diagnosis COVID19,    Cellulitis of right knee    Call end time 1918   Is the patient taking all medications as directed (includes completed medication regime)? Yes   Medication comments Still getting IV antibiotics   Does the patient have a primary care provider?  Yes   What is preventing the patient from scheduling follow up appointments within 7 days of discharge? Haven't had time   Nursing Interventions Advised patient to make appointment,  Educated patient on importance of making appointment   Has the patient kept scheduled appointments due by today? N/A   Comments States he has appt with Dr. Jon next week   What is the Home health agency?  [facility]x   Has home health visited the patient within 72 hours of discharge? Yes   Psychosocial issues? No   What is the patient's perception of their health status since discharge? Improving   Is the patient/caregiver able to teach back signs and symptoms related to disease process for when to call PCP? Yes   Is the patient/caregiver able to teach back signs and symptoms related to disease process for when to call 911? Yes   Is the patient/caregiver able to teach back the hierarchy of who to call/visit for symptoms/problems? PCP, Specialist, Home health nurse, Urgent Care, ED, 911 Yes   If the patient is a current smoker, are they able to teach back resources for cessation? Not a smoker   Additional teach back comments States he still has pain and is moving slow but it is improving.  States his blood sugars have been elevated.  Advised to make appt with PCP to discuss this.     Week 2 Call Completed? Yes   Wrap up additional comments Pt to make appt with PCP and discuss elevated blood sugars           Lesli Hernandez LPN

## 2022-02-02 ENCOUNTER — READMISSION MANAGEMENT (OUTPATIENT)
Dept: CALL CENTER | Facility: HOSPITAL | Age: 62
End: 2022-02-02

## 2022-02-02 NOTE — OUTREACH NOTE
Medical Week 3 Survey      Responses   [facility] patient discharged from? [facility]   Does the patient have one of the following disease processes/diagnoses(primary or secondary)? Other   Week 3 attempt successful? Yes   Call start time 1627   Call end time 1632   Discharge diagnosis COVID19,    Cellulitis of right knee    Person spoke with today (if not patient) and relationship patient   Meds reviewed with patient/caregiver? Yes   Is the patient having any side effects they believe may be caused by any medication additions or changes? No   Does the patient have all medications ordered at discharge? Yes   Prescription comments Pt is on antibiotics    Is the patient taking all medications as directed (includes completed medication regime)? Yes   Comments regarding appointments Pt saw Dr. Jon   Does the patient have a primary care provider?  Yes   Comments regarding PCP Pt will see PCP tomorrow.   Does the patient have an appointment with their PCP within 7 days of discharge? Yes   Has the patient kept scheduled appointments due by today? Yes   What is the Home health agency?  [facility]   Has home health visited the patient within 72 hours of discharge? Yes   What DME was ordered?  [employer] - [employer]    Has all DME been delivered? Yes   Psychosocial issues? No   Did the patient receive a copy of their discharge instructions? Yes   Nursing interventions Reviewed instructions with patient   What is the patient's perception of their health status since discharge? Improving   Is the patient/caregiver able to teach back signs and symptoms related to disease process for when to call PCP? Yes   Is the patient/caregiver able to teach back the hierarchy of who to call/visit for symptoms/problems? PCP, Specialist, Home health nurse, Urgent Care, ED, 911 Yes   If the patient is a current smoker, are they able to teach back resources for cessation? Not a smoker   Additional teach back comments Pt  is trying to work, he is doing small jobs   Week 3 Call Completed? Yes   Wrap up additional comments Pt reports he is doing well.  he denies needs at this time.           Rosalinda Townsend RN

## 2022-02-10 ENCOUNTER — READMISSION MANAGEMENT (OUTPATIENT)
Dept: CALL CENTER | Facility: HOSPITAL | Age: 62
End: 2022-02-10

## 2022-02-10 NOTE — OUTREACH NOTE
Medical Week 4 Survey      Responses   [facility] patient discharged from? [facility]   Does the patient have one of the following disease processes/diagnoses(primary or secondary)? Other   Week 4 attempt successful? No          Roshni Chisholm LPN

## 2022-07-18 ENCOUNTER — OFFICE VISIT (OUTPATIENT)
Dept: ENDOCRINOLOGY | Facility: CLINIC | Age: 62
End: 2022-07-18

## 2022-07-18 VITALS
SYSTOLIC BLOOD PRESSURE: 110 MMHG | OXYGEN SATURATION: 95 % | WEIGHT: 233 LBS | HEIGHT: 73 IN | HEART RATE: 69 BPM | BODY MASS INDEX: 30.88 KG/M2 | DIASTOLIC BLOOD PRESSURE: 70 MMHG

## 2022-07-18 DIAGNOSIS — E78.2 MIXED HYPERLIPIDEMIA: ICD-10-CM

## 2022-07-18 DIAGNOSIS — R94.6 ABNORMAL THYROID FUNCTION TEST: ICD-10-CM

## 2022-07-18 DIAGNOSIS — Z79.4 TYPE 2 DIABETES MELLITUS WITH HYPERGLYCEMIA, WITH LONG-TERM CURRENT USE OF INSULIN: Primary | ICD-10-CM

## 2022-07-18 DIAGNOSIS — N52.9 IMPOTENCE: ICD-10-CM

## 2022-07-18 DIAGNOSIS — E11.65 TYPE 2 DIABETES MELLITUS WITH HYPERGLYCEMIA, WITH LONG-TERM CURRENT USE OF INSULIN: Primary | ICD-10-CM

## 2022-07-18 LAB — GLUCOSE BLDC GLUCOMTR-MCNC: 180 MG/DL (ref 70–105)

## 2022-07-18 PROCEDURE — 99244 OFF/OP CNSLTJ NEW/EST MOD 40: CPT | Performed by: INTERNAL MEDICINE

## 2022-07-18 PROCEDURE — 82962 GLUCOSE BLOOD TEST: CPT | Performed by: INTERNAL MEDICINE

## 2022-07-18 RX ORDER — INSULIN GLARGINE 100 [IU]/ML
35 INJECTION, SOLUTION SUBCUTANEOUS DAILY
COMMUNITY

## 2022-07-18 RX ORDER — METFORMIN HYDROCHLORIDE 500 MG/1
TABLET, EXTENDED RELEASE ORAL
Qty: 60 TABLET | Refills: 6 | Status: SHIPPED | OUTPATIENT
Start: 2022-07-18

## 2022-07-18 RX ORDER — INSULIN LISPRO 100 [IU]/ML
INJECTION, SOLUTION INTRAVENOUS; SUBCUTANEOUS
Qty: 5 PEN | Refills: 6 | Status: SHIPPED | OUTPATIENT
Start: 2022-07-18 | End: 2022-09-20 | Stop reason: SDUPTHER

## 2022-07-18 RX ORDER — ATORVASTATIN CALCIUM 10 MG/1
TABLET, FILM COATED ORAL
Qty: 30 TABLET | Refills: 6 | Status: SHIPPED | OUTPATIENT
Start: 2022-07-18

## 2022-07-18 RX ORDER — TADALAFIL 10 MG/1
10 TABLET ORAL AS NEEDED
Qty: 20 TABLET | Refills: 1 | Status: SHIPPED | OUTPATIENT
Start: 2022-07-18 | End: 2023-07-18

## 2022-07-18 RX ORDER — MAGNESIUM 200 MG
1000 TABLET ORAL DAILY
Qty: 100 EACH | Refills: 4 | Status: SHIPPED | OUTPATIENT
Start: 2022-07-18

## 2022-07-18 RX ORDER — ERGOCALCIFEROL (VITAMIN D2) 50 MCG
2000 CAPSULE ORAL DAILY
Qty: 100 CAPSULE | Refills: 4 | Status: SHIPPED | OUTPATIENT
Start: 2022-07-18

## 2022-07-18 NOTE — PROGRESS NOTES
Endocrine Consult Outpatient  Referred by Mr. Baldo Haynes for diabetes consultation  Patient Care Team:  Ronnie Haynes PA-C as PCP - General (Physician Assistant)     Chief Complaint: Diabetes type 2 uncontrolled        HPI: This is a 61-year-old male with history of type 2 diabetes, hyperlipidemia is referred for uncontrolled type 2 diabetes.    For type 2 diabetes: Initial diagnosis was more than 20 years ago, he tells me that he has done diabetes education he is not checking his blood sugars he does have glucometer with freestyle candi but has not started using it.  Currently taking Lantus 35 units daily along with metformin thousand once a day.  His med list that does include Jardiance, glimepiride and Fiasp but is not using those at this time.    Hyperlipidemia: I do not see any medication listed for cholesterol at this time.    Impotance: His current clinic complaining of erectile dysfunction and would like to use Cialis.        Past Medical History:   Diagnosis Date   • Diabetes mellitus (HCC)    • Disease of thyroid gland    • Hyperlipidemia    • Myocardial infarction (HCC)        Social History     Socioeconomic History   • Marital status: Single   • Number of children: 1   • Years of education: 12   Tobacco Use   • Smoking status: Never Smoker   • Smokeless tobacco: Never Used   Vaping Use   • Vaping Use: Never used   Substance and Sexual Activity   • Alcohol use: Yes   • Sexual activity: Defer       Family History   Problem Relation Age of Onset   • Diabetes Mother    • Diabetes Father    • Diabetes Brother    • Diabetes Maternal Grandmother        No Known Allergies    ROS:   Constitutional:  Admit fatigue, tiredness.    Eyes:  Denies change in visual acuity   HENT:  Denies nasal congestion or sore throat   Respiratory: denies cough, shortness of breath.   Cardiovascular:  denies chest pain, edema   GI:  Denies abdominal pain, nausea, vomiting.    :  Denies dysuria   Musculoskeletal:  Denies  back pain or joint pain   Integument:  Denies dry skin, rash   Neurologic:  Denies headache, focal weakness or sensory changes   Endocrine:  Denies polyuria or polydipsia   Psychiatric:   Admit depression.      Vitals:    07/18/22 1307   BP: 110/70   Pulse: 69   SpO2: 95%     Body mass index is 30.74 kg/m².      Physical Exam:  GEN: NAD, conversant  EYES: EOMI, PERRL, no conjunctival erythema  NECK: no thyromegaly, full ROM   CV: RRR, no murmurs/rubs/gallops, no peripheral edema  LUNG: CTAB, no wheezes/rales/ronchi  SKIN: no rashes, no acanthosis  MSK: no deformities, full ROM of all extremities  NEURO: no tremors, DTR normal  PSYCH: AOX3, appropriate mood, affect normal      Results Review:     I reviewed the patient's new clinical results.    Lab Results   Component Value Date    GLUCOSE 228 (H) 01/12/2022    BUN 14 01/12/2022    CREATININE 0.71 (L) 01/12/2022    EGFRIFNONA 112 01/12/2022    BCR 19.7 01/12/2022    K 4.0 01/12/2022    CO2 25.0 01/12/2022    CALCIUM 9.0 01/12/2022    ALBUMIN 3.90 01/07/2022    AST 6 01/07/2022    ALT 8 01/07/2022     Lab Results   Component Value Date    HGBA1C 11.8 (H) 01/07/2022     Lab Results   Component Value Date    CREATININE 0.71 (L) 01/12/2022     Labs from May 2, 2022 showed a TSH of 5.45, A1c was 12.1, C-peptide 1.2, albumin creatinine ratio was less than 4, total cholesterol 254, , triglycerides 129, HDL 37  Sodium was 141, potassium 4.3, chloride 105, CO2 21, glucose 266, BUN 16, creatinine 1.6, AST 10, ALT 14  Hemoglobin 15.9 with hematocrit 47.5 and platelet count was 236.    Medication Review: Reviewed.       Current Outpatient Medications:   •  atorvastatin (LIPITOR) 80 MG tablet, Take 80 mg by mouth Daily., Disp: , Rfl:   •  clopidogrel (PLAVIX) 75 MG tablet, Take 75 mg by mouth Daily., Disp: , Rfl:   •  gabapentin (NEURONTIN) 300 MG capsule, Take 300 mg by mouth Every Night., Disp: , Rfl:   •  HYDROcodone-acetaminophen (NORCO)  MG per tablet, Take 1  tablet by mouth Every 6 (Six) Hours As Needed., Disp: , Rfl:   •  Insulin Glargine (Lantus SoloStar) 100 UNIT/ML injection pen, 35 Units Daily., Disp: , Rfl:   •  Insulin Pen Needle (Pen Needles) 32G X 4 MM misc, 1 each 4 (Four) Times a Day., Disp: 200 each, Rfl: 2  •  Isopropyl Alcohol (Alcohol Wipes) 70 % misc, Apply 1 each topically 4 (Four) Times a Day., Disp: 200 each, Rfl: 2  •  metFORMIN (GLUCOPHAGE) 1000 MG tablet, Take 1,000 mg by mouth 2 (Two) Times a Day With Meals., Disp: , Rfl:         Assessment and plan:  Diabetes mellitus type 2 with hyperglycemia: Uncontrolled, with high A1c.  We will add Humalog 10 units with each meal and continue Lantus 35 units subcu daily in the morning but add Humalog 10 units with each meal.  We will change metformin to extended release and refer for comprehensive diabetes education.  Recommend to use freestyle candi sensor system.    Hyperlipidemia: We will add atorvastatin 10 mg p.o. daily    Abnormal thyroid function test: We will recheck TSH with free T4 and TPO antibodies    Importance: We will check total and free testosterone with LH, FSH and SHBG and prolactin level and try Cialis.    Derrek Chang MD FACE.

## 2022-07-18 NOTE — PATIENT INSTRUCTIONS
Start Humalog 10 units before each meal  Start atorvastatin 10 mg p.o. daily  Start Cialis 10 mg p.o. as needed not to exceed more than 1 pill/day.  Change metformin to extended release 5 mg tablets and take 2 tablets p.o. daily  Continue Lantus 35 units daily in the morning  Get your labs done fasting and before 10 AM in the next few days  Arrange for comprehensive diabetes education  Start using a freestyle candi sensor system  Start vitamin D 2000 units p.o. daily  Start vitamin B12 1000 mcg sublingual daily  Stop Jardiance and glimepiride  Always keep glucose source in case of low blood sugar  Annual eye exam

## 2022-08-05 ENCOUNTER — LAB (OUTPATIENT)
Dept: LAB | Facility: HOSPITAL | Age: 62
End: 2022-08-05

## 2022-08-05 DIAGNOSIS — R94.6 ABNORMAL THYROID FUNCTION TEST: ICD-10-CM

## 2022-08-05 DIAGNOSIS — E11.65 TYPE 2 DIABETES MELLITUS WITH HYPERGLYCEMIA, WITH LONG-TERM CURRENT USE OF INSULIN: ICD-10-CM

## 2022-08-05 DIAGNOSIS — Z79.4 TYPE 2 DIABETES MELLITUS WITH HYPERGLYCEMIA, WITH LONG-TERM CURRENT USE OF INSULIN: ICD-10-CM

## 2022-08-05 DIAGNOSIS — E78.2 MIXED HYPERLIPIDEMIA: ICD-10-CM

## 2022-08-05 LAB
CHOLEST SERPL-MCNC: 194 MG/DL (ref 0–200)
FSH SERPL-ACNC: 5.84 MIU/ML
HBA1C MFR BLD: 8 % (ref 4.8–5.6)
HDLC SERPL-MCNC: 34 MG/DL (ref 40–60)
LDLC SERPL CALC-MCNC: 149 MG/DL (ref 0–100)
LDLC/HDLC SERPL: 4.37 {RATIO}
LH SERPL-ACNC: 2.85 MIU/ML
PROLACTIN SERPL-MCNC: 16.2 NG/ML (ref 4.04–15.2)
T4 FREE SERPL-MCNC: 1.17 NG/DL (ref 0.93–1.7)
TRIGL SERPL-MCNC: 57 MG/DL (ref 0–150)
TSH SERPL DL<=0.05 MIU/L-ACNC: 7.51 UIU/ML (ref 0.27–4.2)
VLDLC SERPL-MCNC: 11 MG/DL (ref 5–40)

## 2022-08-05 PROCEDURE — 84270 ASSAY OF SEX HORMONE GLOBUL: CPT

## 2022-08-05 PROCEDURE — 84402 ASSAY OF FREE TESTOSTERONE: CPT

## 2022-08-05 PROCEDURE — 83001 ASSAY OF GONADOTROPIN (FSH): CPT

## 2022-08-05 PROCEDURE — 83036 HEMOGLOBIN GLYCOSYLATED A1C: CPT

## 2022-08-05 PROCEDURE — 80061 LIPID PANEL: CPT

## 2022-08-05 PROCEDURE — 83002 ASSAY OF GONADOTROPIN (LH): CPT

## 2022-08-05 PROCEDURE — 86376 MICROSOMAL ANTIBODY EACH: CPT

## 2022-08-05 PROCEDURE — 84439 ASSAY OF FREE THYROXINE: CPT

## 2022-08-05 PROCEDURE — 84403 ASSAY OF TOTAL TESTOSTERONE: CPT

## 2022-08-05 PROCEDURE — 84146 ASSAY OF PROLACTIN: CPT

## 2022-08-05 PROCEDURE — 84443 ASSAY THYROID STIM HORMONE: CPT

## 2022-08-05 PROCEDURE — 36415 COLL VENOUS BLD VENIPUNCTURE: CPT

## 2022-08-06 LAB
SHBG SERPL-SCNC: 55.9 NMOL/L (ref 19.3–76.4)
THYROPEROXIDASE AB SERPL-ACNC: <8 IU/ML (ref 0–34)

## 2022-08-08 DIAGNOSIS — N52.9 IMPOTENCE: ICD-10-CM

## 2022-08-08 DIAGNOSIS — R94.6 ABNORMAL THYROID FUNCTION TEST: Primary | ICD-10-CM

## 2022-08-09 RX ORDER — LEVOTHYROXINE SODIUM 0.03 MG/1
25 TABLET ORAL DAILY
Qty: 30 TABLET | Refills: 2 | Status: SHIPPED | OUTPATIENT
Start: 2022-08-09 | End: 2022-09-20 | Stop reason: SDUPTHER

## 2022-08-10 LAB
TESTOST FREE SERPL-MCNC: 7.5 PG/ML (ref 6.6–18.1)
TESTOST SERPL-MCNC: 495 NG/DL (ref 264–916)

## 2022-09-20 RX ORDER — INSULIN LISPRO 100 [IU]/ML
INJECTION, SOLUTION INTRAVENOUS; SUBCUTANEOUS
Qty: 15 ML | Refills: 2 | Status: SHIPPED | OUTPATIENT
Start: 2022-09-20 | End: 2022-09-23 | Stop reason: CLARIF

## 2022-09-20 RX ORDER — LEVOTHYROXINE SODIUM 0.03 MG/1
25 TABLET ORAL DAILY
Qty: 30 TABLET | Refills: 2 | Status: SHIPPED | OUTPATIENT
Start: 2022-09-20

## 2022-09-23 DIAGNOSIS — E11.65 TYPE 2 DIABETES MELLITUS WITH HYPERGLYCEMIA, WITH LONG-TERM CURRENT USE OF INSULIN: Primary | ICD-10-CM

## 2022-09-23 DIAGNOSIS — Z79.4 TYPE 2 DIABETES MELLITUS WITH HYPERGLYCEMIA, WITH LONG-TERM CURRENT USE OF INSULIN: Primary | ICD-10-CM

## 2022-09-23 DIAGNOSIS — E11.65 TYPE 2 DIABETES MELLITUS WITH HYPERGLYCEMIA, WITH LONG-TERM CURRENT USE OF INSULIN: ICD-10-CM

## 2022-09-23 DIAGNOSIS — Z79.4 TYPE 2 DIABETES MELLITUS WITH HYPERGLYCEMIA, WITH LONG-TERM CURRENT USE OF INSULIN: ICD-10-CM

## 2022-09-23 RX ORDER — INSULIN ASPART 100 [IU]/ML
10 INJECTION, SOLUTION INTRAVENOUS; SUBCUTANEOUS
Qty: 15 ML | Refills: 2 | Status: SHIPPED | OUTPATIENT
Start: 2022-09-23 | End: 2022-09-23 | Stop reason: SDUPTHER

## 2022-09-23 RX ORDER — INSULIN ASPART 100 [IU]/ML
10 INJECTION, SOLUTION INTRAVENOUS; SUBCUTANEOUS
Qty: 15 ML | Refills: 2 | Status: SHIPPED | OUTPATIENT
Start: 2022-09-23

## 2023-02-21 PROBLEM — E11.65 TYPE 2 DIABETES MELLITUS WITH HYPERGLYCEMIA: Status: ACTIVE | Noted: 2023-02-21

## 2023-02-21 PROBLEM — M25.559 ARTHRALGIA OF HIP: Status: ACTIVE | Noted: 2023-02-21
